# Patient Record
Sex: FEMALE | Race: WHITE | NOT HISPANIC OR LATINO | Employment: OTHER | URBAN - METROPOLITAN AREA
[De-identification: names, ages, dates, MRNs, and addresses within clinical notes are randomized per-mention and may not be internally consistent; named-entity substitution may affect disease eponyms.]

---

## 2017-01-11 DIAGNOSIS — M19.90 ARTHRITIS: ICD-10-CM

## 2017-01-11 RX ORDER — DICLOFENAC SODIUM 75 MG/1
TABLET, DELAYED RELEASE ORAL
Qty: 60 TABLET | Refills: 2 | Status: SHIPPED | OUTPATIENT
Start: 2017-01-11 | End: 2017-03-01 | Stop reason: SDUPTHER

## 2017-01-13 ENCOUNTER — TELEPHONE (OUTPATIENT)
Dept: INTERNAL MEDICINE | Facility: CLINIC | Age: 82
End: 2017-01-13

## 2017-01-17 ENCOUNTER — OFFICE VISIT (OUTPATIENT)
Dept: INTERNAL MEDICINE | Facility: CLINIC | Age: 82
End: 2017-01-17

## 2017-01-17 VITALS
WEIGHT: 134 LBS | BODY MASS INDEX: 25.3 KG/M2 | RESPIRATION RATE: 14 BRPM | DIASTOLIC BLOOD PRESSURE: 90 MMHG | HEIGHT: 61 IN | SYSTOLIC BLOOD PRESSURE: 170 MMHG

## 2017-01-17 DIAGNOSIS — I10 ESSENTIAL HYPERTENSION: Primary | ICD-10-CM

## 2017-01-17 PROCEDURE — 99213 OFFICE O/P EST LOW 20 MIN: CPT | Performed by: INTERNAL MEDICINE

## 2017-01-17 RX ORDER — CHLORTHALIDONE 25 MG/1
25 TABLET ORAL DAILY
Qty: 90 TABLET | Refills: 1 | Status: SHIPPED | OUTPATIENT
Start: 2017-01-17 | End: 2017-03-01 | Stop reason: SDUPTHER

## 2017-01-17 RX ORDER — METOPROLOL TARTRATE 50 MG/1
TABLET, FILM COATED ORAL
Qty: 270 TABLET | Refills: 1 | Status: SHIPPED | OUTPATIENT
Start: 2017-01-17 | End: 2017-01-28 | Stop reason: SDUPTHER

## 2017-01-17 NOTE — PATIENT INSTRUCTIONS
Continue metoprolol 50 mgm, 2 in the mornings and one in the evenings. Continue current doses of lisinopril and amlodipine.  Add chlorthalidone.  Take this in the morning.

## 2017-01-17 NOTE — PROGRESS NOTES
"Chief Complaint   Patient presents with   • Hypertension     B/P has been running high been running 190/93 pulse of 64         Subjective   Karena Moscoso is a 89 y.o. female     Hypertension   This is a chronic problem. The problem is uncontrolled. Associated symptoms include blurred vision (macular degeneration) and headaches (head feels strange when blood pressure is up). Pertinent negatives include no chest pain, orthopnea, palpitations, peripheral edema, PND or shortness of breath. Agents associated with hypertension include NSAIDs. Treatments tried: current treatment: ACE-I, CCB, BB.   :   She had been taking metoprolol 50 mgm, 2 in the am and one in the pm since Friday and had continued usual dose of lisinopril and amlodipine.      Blood pressure has been 184/74, 176/78, 190/93,     The following portions of the patient's history were reviewed and updated as appropriate: allergies, current medications, past social history, problem list, past surgical history    Review of Systems   Constitutional: Negative for activity change and appetite change.   HENT: Negative for nosebleeds.    Eyes: Positive for blurred vision (macular degeneration).   Respiratory: Negative for cough and shortness of breath.    Cardiovascular: Negative for chest pain, palpitations, orthopnea, leg swelling and PND.   Neurological: Positive for headaches (head feels strange when blood pressure is up).   Psychiatric/Behavioral: Negative for sleep disturbance.       Visit Vitals   • /90 (BP Location: Left arm, Patient Position: Sitting, Cuff Size: Adult)   • Resp 14   • Ht 61\" (154.9 cm)   • Wt 134 lb (60.8 kg)   • BMI 25.32 kg/m2    Blood pressure initially 184/92    Objective   Physical Exam   Constitutional: She is oriented to person, place, and time. She appears well-developed and well-nourished. No distress.   Neck: Normal carotid pulses present. Carotid bruit is not present.   Cardiovascular: Normal rate, regular rhythm, S1 normal, " S2 normal and intact distal pulses.  Exam reveals no gallop and no friction rub.    No murmur heard.  Pulses:       Carotid pulses are 2+ on the right side, and 2+ on the left side.  Pulmonary/Chest: Effort normal and breath sounds normal. No respiratory distress. She has no wheezes. She has no rales. Chest wall is not dull to percussion.   Musculoskeletal: She exhibits no edema.   Neurological: She is alert and oriented to person, place, and time.   Skin: Skin is warm and dry.   Nursing note and vitals reviewed.      Assessment/Plan   Problem List Items Addressed This Visit        Cardiovascular and Mediastinum    Essential hypertension - Primary    Relevant Medications    metoprolol tartrate (LOPRESSOR) 50 MG tablet    chlorthalidone (HYGROTON) 25 MG tablet        Chlorthalidone added today.  Recommended she continue the increased dose of metoprolol.  Return on Friday to reevaluate blood pressure and check nonfasting basic metabolic panel.

## 2017-01-17 NOTE — MR AVS SNAPSHOT
Karena GEORGES Danis   1/17/2017 3:00 PM   Office Visit    Dept Phone:  [phone]   Encounter #:  [id number]    Provider:  Alexus Grimm MD   Department:  [facility] INTERNAL MEDICINE                Your Full Care Plan              Today's Medication Changes          These changes are accurate as of: 1/17/17  4:03 PM.  If you have any questions, ask your nurse or doctor.               New Medication(s)Ordered:     chlorthalidone 25 MG tablet   Commonly known as:  HYGROTON   Take 1 tablet by mouth Daily.   Started by:  Alexus Grimm MD         Medication(s)that have changed:     metoprolol tartrate 50 MG tablet   Commonly known as:  LOPRESSOR   Take two tablets each morning and one each evening   What changed:    - how much to take  - how to take this  - when to take this  - additional instructions   Changed by:  Alexus Grimm MD         Stop taking medication(s)listed here:     lisinopril 40 MG tablet   Commonly known as:  PRINIVIL,ZESTRIL   Stopped by:  Alexus Grimm MD                Where to Get Your Medications      These medications were sent to [address] - [phone]  - [phone] [place] [address]     Phone:  [phone]     chlorthalidone 25 MG tablet    metoprolol tartrate 50 MG tablet                  Your Updated Medication List          This list is accurate as of: 1/17/17  4:03 PM.  Always use your most recent med list.                amLODIPine 10 MG tablet   Commonly known as:  NORVASC   TAKE ONE TABLET BY MOUTH DAILY       aspirin 81 MG EC tablet       atorvastatin 20 MG tablet   Commonly known as:  LIPITOR       CALCIUM 600 PO       carBAMazepine 200 MG 12 hr capsule   Commonly known as:  CARBATROL       chlorthalidone 25 MG tablet   Commonly known as:  HYGROTON   Take 1 tablet by mouth Daily.       CLARITIN-D 12 HOUR PO       diclofenac 75 MG EC tablet    Commonly known as:  VOLTAREN   TAKE ONE TABLET BY MOUTH TWICE A DAY WITH FOOD       digoxin 125 MCG tablet   Commonly known as:  LANOXIN   TAKE ONE TABLET BY MOUTH DAILY       doxepin 100 MG capsule   Commonly known as:  SINEquan       GNP VITAMIN C CR PO       guaiFENesin 600 MG 12 hr tablet   Commonly known as:  MUCINEX       HYDROcodone-acetaminophen 5-500 MG per tablet   Commonly known as:  VICODIN       ICAPS PLUS PO       meclizine 25 MG tablet   Commonly known as:  ANTIVERT   Take 1 tablet by mouth 3 (Three) Times a Day As Needed for dizziness.       methotrexate 2.5 MG tablet   Commonly known as:  RHEUMATREX       metoprolol tartrate 50 MG tablet   Commonly known as:  LOPRESSOR   Take two tablets each morning and one each evening       NASONEX 50 MCG/ACT nasal spray   Generic drug:  mometasone               You Were Diagnosed With        Codes Comments    Essential hypertension    -  Primary ICD-10-CM: I10  ICD-9-CM: 401.9       Instructions    Continue metoprolol 50 mgm, 2 in the mornings and one in the evenings. Continue current doses of lisinopril and amlodipine.  Add chlorthalidone.  Take this in the morning.      Patient Instructions History      Upcoming Appointments     Visit Type Date Time Department    OFFICE VISIT 2017  3:00 PM [facility]    FOLLOW UP 2017  8:15 AM [facility] Signup     [facility] [employer] allows you to send messages to your doctor, view your test results, renew your prescriptions, schedule appointments, and more. To sign up, go to [employer] and click on the Sign Up Now link in the New User? box. Enter your [employer] Activation Code exactly as it appears below along with the last four digits of your Social Security Number and your Date of Birth () to complete the sign-up process. If you do not sign up before the expiration date, you must request a new code.    [employer] Activation Code: [email or web address]  Expires: 2017  3:55  "PM    If you have questions, you can email [email or web address] or call [phone] to talk to our MyChart staff. Remember, [employer] is NOT to be used for urgent needs. For medical emergencies, dial 911.               Other Info from Your Visit           Your Appointments     Feb 28, 2017  8:15 AM EST   Follow Up with Alexus Grimm MD   [facility] INTERNAL MEDICINE (--)    [facility] [address] [address] [phone]           Arrive 15 minutes prior to appointment.              Allergies     Codeine      Milk-related Compounds      Lactose intolerant      Reason for Visit     Hypertension B/P has been running high been running 190/93 pulse of 64       Vital Signs     Blood Pressure Respirations Height Weight Body Mass Index Smoking Status    170/90 (BP Location: Left arm, Patient Position: Sitting, Cuff Size: Adult) 14 61\" (154.9 cm) 134 lb (60.8 kg) 25.32 kg/m2 Never Smoker      Problems and Diagnoses Noted     High blood pressure        "

## 2017-01-20 ENCOUNTER — OFFICE VISIT (OUTPATIENT)
Dept: INTERNAL MEDICINE | Facility: CLINIC | Age: 82
End: 2017-01-20

## 2017-01-20 VITALS
HEIGHT: 61 IN | SYSTOLIC BLOOD PRESSURE: 160 MMHG | WEIGHT: 130 LBS | DIASTOLIC BLOOD PRESSURE: 84 MMHG | RESPIRATION RATE: 14 BRPM | BODY MASS INDEX: 24.55 KG/M2

## 2017-01-20 DIAGNOSIS — I10 ESSENTIAL HYPERTENSION: Primary | ICD-10-CM

## 2017-01-20 LAB
BUN SERPL-MCNC: 26 MG/DL (ref 8–23)
BUN/CREAT SERPL: 28.6 (ref 7–25)
CALCIUM SERPL-MCNC: 9.9 MG/DL (ref 8.2–9.6)
CHLORIDE SERPL-SCNC: 97 MMOL/L (ref 98–107)
CO2 SERPL-SCNC: 29.4 MMOL/L (ref 22–29)
CREAT SERPL-MCNC: 0.91 MG/DL (ref 0.57–1)
GLUCOSE SERPL-MCNC: 102 MG/DL (ref 65–99)
POTASSIUM SERPL-SCNC: 4.4 MMOL/L (ref 3.5–5.2)
SODIUM SERPL-SCNC: 142 MMOL/L (ref 136–145)

## 2017-01-20 PROCEDURE — 99212 OFFICE O/P EST SF 10 MIN: CPT | Performed by: INTERNAL MEDICINE

## 2017-01-20 NOTE — MR AVS SNAPSHOT
Karena Moscoso   1/20/2017 9:45 AM   Office Visit    Dept Phone:  [phone]   Encounter #:  [id number]    Provider:  Alexus Grimm MD   Department:  [facility] INTERNAL MEDICINE                Your Full Care Plan              Your Updated Medication List          This list is accurate as of: 1/20/17 10:06 AM.  Always use your most recent med list.                amLODIPine 10 MG tablet   Commonly known as:  NORVASC   TAKE ONE TABLET BY MOUTH DAILY       aspirin 81 MG EC tablet       atorvastatin 20 MG tablet   Commonly known as:  LIPITOR       CALCIUM 600 PO       carBAMazepine 200 MG 12 hr capsule   Commonly known as:  CARBATROL       chlorthalidone 25 MG tablet   Commonly known as:  HYGROTON   Take 1 tablet by mouth Daily.       CLARITIN-D 12 HOUR PO       diclofenac 75 MG EC tablet   Commonly known as:  VOLTAREN   TAKE ONE TABLET BY MOUTH TWICE A DAY WITH FOOD       digoxin 125 MCG tablet   Commonly known as:  LANOXIN   TAKE ONE TABLET BY MOUTH DAILY       doxepin 100 MG capsule   Commonly known as:  SINEquan       GNP VITAMIN C CR PO       guaiFENesin 600 MG 12 hr tablet   Commonly known as:  MUCINEX       HYDROcodone-acetaminophen 5-500 MG per tablet   Commonly known as:  VICODIN       ICAPS PLUS PO       meclizine 25 MG tablet   Commonly known as:  ANTIVERT   Take 1 tablet by mouth 3 (Three) Times a Day As Needed for dizziness.       methotrexate 2.5 MG tablet   Commonly known as:  RHEUMATREX       metoprolol tartrate 50 MG tablet   Commonly known as:  LOPRESSOR   Take two tablets each morning and one each evening       NASONEX 50 MCG/ACT nasal spray   Generic drug:  mometasone               You Were Diagnosed With        Codes Comments    Essential hypertension    -  Primary ICD-10-CM: I10  ICD-9-CM: 401.9       Instructions     None    Patient Instructions History      Upcoming Appointments     Visit Type Date Time Department    FOLLOW UP 1/20/2017  9:45 AM ALEXX WILLIS  [facility]    FOLLOW UP 2017  8:15 AM ALEXX WILLIS [facility]      [employer] Signup     [facility] [employer] allows you to send messages to your doctor, view your test results, renew your prescriptions, schedule appointments, and more. To sign up, go to [employer] and click on the Sign Up Now link in the New User? box. Enter your [employer] Activation Code exactly as it appears below along with the last four digits of your Social Security Number and your Date of Birth () to complete the sign-up process. If you do not sign up before the expiration date, you must request a new code.    [employer] Activation Code: [email or web address]  Expires: 2017  3:55 PM    If you have questions, you can email [email or web address] or call [phone] to talk to our [employer] staff. Remember, [employer] is NOT to be used for urgent needs. For medical emergencies, dial 911.               Other Info from Your Visit           Your Appointments     2017  8:15 AM EST   Follow Up with Alexus Grimm MD   [facility] INTERNAL MEDICINE (--)    [address] [address] [phone]           Arrive 15 minutes prior to appointment.              Allergies     Codeine      Milk-related Compounds      Lactose intolerant      Reason for Visit     Hypertension follow up     Earache right ear pain but hurts down into throat       Vital Signs     Blood Pressure Respirations Height Weight Body Mass Index Smoking Status    160/84 (BP Location: Left arm, Patient Position: Sitting, Cuff Size: Adult) 14 61\" (154.9 cm) 130 lb (59 kg) 24.56 kg/m2 Never Smoker      Problems and Diagnoses Noted     High blood pressure        "

## 2017-01-20 NOTE — PROGRESS NOTES
"Chief Complaint   Patient presents with   • Hypertension     follow up    • Earache     right ear pain but hurts down into throat         Subjective   Karena Moscoso is a 89 y.o. female     HPI: at her last appointment, chlorthalidone was added to her antihypertensive regimen because of higher blood pressure readings.  She has not been taking this for the last 2 days.  She reports no side effects.  Blood pressures at home have been a little bit better.  Systolic has been usually around 160 although she says that last night it did get up to 180.  She denies headaches, dizziness.    Her right ear started to hurt recently.  No fever, chills, sweats.  No sore throat.  No cough.    The following portions of the patient's history were reviewed and updated as appropriate: allergies, current medications, past social history, problem list, past surgical history    Review of Systems   Constitutional: Negative for appetite change.   Cardiovascular: Negative for palpitations.       Visit Vitals   • /84 (BP Location: Left arm, Patient Position: Sitting, Cuff Size: Adult)   • Resp 14   • Ht 61\" (154.9 cm)   • Wt 130 lb (59 kg)   • BMI 24.56 kg/m2   recheck right arm 164/86.     Objective   Physical Exam   Cardiovascular: Normal rate, regular rhythm and normal heart sounds.    Pulmonary/Chest: Breath sounds normal.   Nursing note and vitals reviewed.      Assessment/Plan   Problem List Items Addressed This Visit        Cardiovascular and Mediastinum    Essential hypertension - Primary    Relevant Orders    Basic Metabolic Panel        Blood pressure is improved.  She will continue her current regimen.  Basic metabolic panel pending.  Return as scheduled in February.  On examination of her ears, both tympanic membranes are clear, intact, no erythema.  Posterior pharynx is clear.  She will observe her current symptoms of right earache.  "

## 2017-01-30 RX ORDER — METOPROLOL TARTRATE 50 MG/1
TABLET, FILM COATED ORAL
Qty: 90 TABLET | Refills: 5 | Status: SHIPPED | OUTPATIENT
Start: 2017-01-30 | End: 2017-03-01 | Stop reason: SDUPTHER

## 2017-02-14 RX ORDER — LISINOPRIL 40 MG/1
TABLET ORAL
Qty: 60 TABLET | Refills: 10 | Status: SHIPPED | OUTPATIENT
Start: 2017-02-14 | End: 2017-03-01 | Stop reason: SDUPTHER

## 2017-02-21 ENCOUNTER — TELEPHONE (OUTPATIENT)
Dept: INTERNAL MEDICINE | Facility: CLINIC | Age: 82
End: 2017-02-21

## 2017-02-21 NOTE — TELEPHONE ENCOUNTER
Notified Dr Moscoso (patients son) we would be more than happy to draw labs needed for her. Son informed at the time of the appointment he will be bringing fax numbers and everything needed so that all physicians his mother attends stay on the same page.

## 2017-02-21 NOTE — TELEPHONE ENCOUNTER
----- Message from Regina Sheppard sent at 2/21/2017 10:10 AM EST -----  Pt has appt with you next week for ov and labs. Pts son calling to see if you will draw a  Carbamazepine Level will she is here.  He has oked with Dr López.  Pt sees Dr López in 2 weeks and pts son would like to have labs drawn at the same time to make it easier on her.   He woul dlike to have you check if the lab could draw a  - Carbamazepine Level ( generic for CARBATROL)   Please advise  Pts son- Dr. Jeovanny Moscoso -[phone](cell)  Or [phone] (home)

## 2017-02-28 ENCOUNTER — OFFICE VISIT (OUTPATIENT)
Dept: INTERNAL MEDICINE | Facility: CLINIC | Age: 82
End: 2017-02-28

## 2017-02-28 VITALS
SYSTOLIC BLOOD PRESSURE: 128 MMHG | HEIGHT: 61 IN | WEIGHT: 132 LBS | DIASTOLIC BLOOD PRESSURE: 70 MMHG | BODY MASS INDEX: 24.92 KG/M2

## 2017-02-28 DIAGNOSIS — R00.2 PALPITATIONS: ICD-10-CM

## 2017-02-28 DIAGNOSIS — E78.5 HYPERLIPIDEMIA, UNSPECIFIED HYPERLIPIDEMIA TYPE: ICD-10-CM

## 2017-02-28 DIAGNOSIS — I10 ESSENTIAL HYPERTENSION: Primary | ICD-10-CM

## 2017-02-28 DIAGNOSIS — G50.0 TRIGEMINAL NEURALGIA: ICD-10-CM

## 2017-02-28 DIAGNOSIS — Z79.899 ENCOUNTER FOR MEDICATION MANAGEMENT: ICD-10-CM

## 2017-02-28 LAB
ALBUMIN SERPL-MCNC: 3.54 G/DL (ref 3.4–4.6)
ALBUMIN/GLOB SERPL: 1.2 G/DL
ALP SERPL-CCNC: 102 U/L (ref 46–116)
ALT SERPL W P-5'-P-CCNC: 22 U/L (ref 14–59)
ANION GAP SERPL CALCULATED.3IONS-SCNC: 8 MMOL/L
AST SERPL-CCNC: 18 U/L (ref 7–37)
BILIRUB SERPL-MCNC: 0.2 MG/DL (ref 0.2–1)
BUN BLD-MCNC: 32 MG/DL (ref 6–22)
BUN/CREAT SERPL: 34.4 (ref 7–25)
CALCIUM SPEC-SCNC: 9 MG/DL (ref 8.6–10.5)
CHLORIDE SERPL-SCNC: 100 MMOL/L (ref 95–107)
CHOLEST SERPL-MCNC: 206 MG/DL (ref 0–200)
CO2 SERPL-SCNC: 31 MMOL/L (ref 23–32)
CREAT BLD-MCNC: 0.93 MG/DL (ref 0.55–1.02)
DEPRECATED RDW RBC AUTO: 48.2 FL (ref 37–54)
ERYTHROCYTE [DISTWIDTH] IN BLOOD BY AUTOMATED COUNT: 14.2 % (ref 11.5–15)
GFR SERPL CREATININE-BSD FRML MDRD: 57 ML/MIN/1.73
GLOBULIN UR ELPH-MCNC: 3.1 GM/DL
GLUCOSE BLD-MCNC: 92 MG/DL (ref 70–100)
HCT VFR BLD AUTO: 37.3 % (ref 34.1–44.9)
HDLC SERPL-MCNC: 57 MG/DL (ref 40–81)
HGB BLD-MCNC: 11.9 G/DL (ref 11.2–15.7)
LDLC SERPL CALC-MCNC: 116 MG/DL (ref 0–100)
LDLC/HDLC SERPL: 2.04 {RATIO}
MCH RBC QN AUTO: 30.4 PG (ref 26–34)
MCHC RBC AUTO-ENTMCNC: 31.9 G/DL (ref 31–37)
MCV RBC AUTO: 95.2 FL (ref 80–100)
PLATELET # BLD AUTO: 210 10*3/MM3 (ref 150–450)
PMV BLD AUTO: 11.9 FL (ref 6–12)
POTASSIUM BLD-SCNC: 4.7 MMOL/L (ref 3.3–5.3)
PROT SERPL-MCNC: 6.6 G/DL (ref 6.3–8.4)
RBC # BLD AUTO: 3.92 10*6/MM3 (ref 3.93–5.22)
SODIUM BLD-SCNC: 139 MMOL/L (ref 136–145)
TRIGL SERPL-MCNC: 163 MG/DL (ref 0–150)
VLDLC SERPL-MCNC: 32.6 MG/DL
WBC NRBC COR # BLD: 6.59 10*3/MM3 (ref 5–10)

## 2017-02-28 PROCEDURE — 80061 LIPID PANEL: CPT | Performed by: INTERNAL MEDICINE

## 2017-02-28 PROCEDURE — 99213 OFFICE O/P EST LOW 20 MIN: CPT | Performed by: INTERNAL MEDICINE

## 2017-02-28 PROCEDURE — 85027 COMPLETE CBC AUTOMATED: CPT | Performed by: INTERNAL MEDICINE

## 2017-02-28 PROCEDURE — 80053 COMPREHEN METABOLIC PANEL: CPT | Performed by: INTERNAL MEDICINE

## 2017-02-28 PROCEDURE — 36415 COLL VENOUS BLD VENIPUNCTURE: CPT | Performed by: INTERNAL MEDICINE

## 2017-02-28 NOTE — PROGRESS NOTES
Chief Complaint   Patient presents with   • Hyperlipidemia   • Hypertension        Subjective   Karena Moscoso is a 89 y.o. female     HPI: Hyperlipidemia:  No management changes were made at her last appointment. Her most recent lipid panel was done on 9/27/201.  Total cholesterol was 219, Triglycerides 180, HDL 65, .  She is currently taking a statin.  Prudent diet and regular exercise have also been recommended. By report, there is good compliance with treatment and good tolerance.  She has not experienced statin associated myalgias, dyspepsia.  She has not had liver enzyme elevation.     Hypertension:  A thiazide diuretic was added at her last appointment.     She has not had problems with headaches, visual disturbance, dizziness.  Recent blood pressures have been controlled.  She has not had associated symptoms of chest pain, syncope, edema, orthopnea, GIRARD, PND.   She is currently taking a CCB, thiazide diuretic, ACE-I, BB.  Prudent diet and regular exercise have also been recommended. By report, there is good compliance and good tolerance of medication.     Trigeminal neuralgia: She is currently taking carbamazepine for this.    Palpitations: She has not noticed palpitations recently.  She continues to take a beta blocker for this.  She also takes digoxin.            The following portions of the patient's history were reviewed and updated as appropriate: allergies, current medications, past social history, problem list, past surgical history    Review of Systems   Constitutional: Negative for activity change and appetite change.   HENT: Negative for nosebleeds.    Eyes: Negative for visual disturbance.   Respiratory: Positive for shortness of breath (climbing steps, walking fast). Negative for cough.    Cardiovascular: Negative for chest pain, palpitations and leg swelling.   Neurological: Negative for headaches.   Psychiatric/Behavioral: Negative for sleep disturbance.       Visit Vitals   • /70  "(BP Location: Left arm, Patient Position: Sitting, Cuff Size: Adult)   • Ht 61\" (154.9 cm)   • Wt 132 lb (59.9 kg)   • BMI 24.94 kg/m2        Objective   Physical Exam   Constitutional: She is oriented to person, place, and time. She appears well-developed and well-nourished. No distress.   Using a walker   Neck: Normal carotid pulses present. Carotid bruit is not present.   Cardiovascular: Normal rate, regular rhythm, S1 normal, S2 normal and intact distal pulses.  Exam reveals no gallop and no friction rub.    Murmur heard.  Pulses:       Carotid pulses are 2+ on the right side, and 2+ on the left side.  Pulmonary/Chest: Effort normal and breath sounds normal. No respiratory distress. She has no wheezes. She has no rhonchi. She has no rales. Chest wall is not dull to percussion.   Musculoskeletal: She exhibits no edema.   Neurological: She is alert and oriented to person, place, and time.   Skin: Skin is warm and dry.   Nursing note and vitals reviewed.      Assessment/Plan   Problem List Items Addressed This Visit        Cardiovascular and Mediastinum    Hyperlipidemia    Essential hypertension - Primary    Relevant Orders    Comprehensive Metabolic Panel    CBC (No Diff)    Lipid Panel       Nervous and Auditory    Trigeminal neuralgia      Other Visit Diagnoses     Encounter for medication management        Relevant Orders    Carbamazepine level, total    Digoxin level            "

## 2017-03-01 DIAGNOSIS — M19.90 ARTHRITIS: ICD-10-CM

## 2017-03-01 RX ORDER — MECLIZINE HYDROCHLORIDE 25 MG/1
25 TABLET ORAL 3 TIMES DAILY PRN
Qty: 90 TABLET | Refills: 3 | Status: SHIPPED | OUTPATIENT
Start: 2017-03-01 | End: 2017-12-07

## 2017-03-01 RX ORDER — METOPROLOL TARTRATE 50 MG/1
TABLET, FILM COATED ORAL
Qty: 270 TABLET | Refills: 3 | Status: SHIPPED | OUTPATIENT
Start: 2017-03-01 | End: 2017-12-07 | Stop reason: SDUPTHER

## 2017-03-01 RX ORDER — DICLOFENAC SODIUM 75 MG/1
75 TABLET, DELAYED RELEASE ORAL 2 TIMES DAILY
Qty: 180 TABLET | Refills: 3 | Status: SHIPPED | OUTPATIENT
Start: 2017-03-01

## 2017-03-01 RX ORDER — AMLODIPINE BESYLATE 10 MG/1
10 TABLET ORAL DAILY
Qty: 90 TABLET | Refills: 3 | Status: SHIPPED | OUTPATIENT
Start: 2017-03-01

## 2017-03-01 RX ORDER — LISINOPRIL 40 MG/1
40 TABLET ORAL 2 TIMES DAILY
Qty: 180 TABLET | Refills: 3 | Status: SHIPPED | OUTPATIENT
Start: 2017-03-01

## 2017-03-01 RX ORDER — DOXEPIN HYDROCHLORIDE 100 MG/1
100 CAPSULE ORAL NIGHTLY
Qty: 90 CAPSULE | Refills: 3 | Status: SHIPPED | OUTPATIENT
Start: 2017-03-01 | End: 2017-06-23

## 2017-03-01 RX ORDER — CHLORTHALIDONE 25 MG/1
25 TABLET ORAL DAILY
Qty: 90 TABLET | Refills: 3 | Status: SHIPPED | OUTPATIENT
Start: 2017-03-01

## 2017-03-01 RX ORDER — ATORVASTATIN CALCIUM 20 MG/1
20 TABLET, FILM COATED ORAL DAILY
Qty: 90 TABLET | Refills: 3 | Status: ON HOLD | OUTPATIENT
Start: 2017-03-01 | End: 2017-12-15

## 2017-03-01 RX ORDER — MOMETASONE FUROATE 50 UG/1
2 SPRAY, METERED NASAL DAILY
Qty: 3 EACH | Refills: 3 | Status: SHIPPED | OUTPATIENT
Start: 2017-03-01 | End: 2017-12-07

## 2017-03-01 RX ORDER — DIGOXIN 125 MCG
125 TABLET ORAL
Qty: 90 TABLET | Refills: 3 | Status: SHIPPED | OUTPATIENT
Start: 2017-03-01

## 2017-03-08 ENCOUNTER — LAB (OUTPATIENT)
Dept: INTERNAL MEDICINE | Facility: CLINIC | Age: 82
End: 2017-03-08

## 2017-03-08 DIAGNOSIS — Z79.899 ENCOUNTER FOR MEDICATION MANAGEMENT: ICD-10-CM

## 2017-03-08 LAB
CARBAMAZEPINE SERPL-MCNC: 6.7 MCG/ML (ref 8–12)
DIGOXIN SERPL-MCNC: 0.7 NG/ML (ref 0.5–0.9)

## 2017-06-02 NOTE — TELEPHONE ENCOUNTER
----- Message from Rosa M Gordon sent at 1/13/2017  9:33 AM EST -----  Contact: pt  Pt says b/p is elevated. She says 184/74 was the last reading. Pt is concerned and would like to discuss. Please return call.     #[phone]    Magi  
I returned call patient to discuss bp readings. She reports readings as followed: 190/92, 191/94, 184/74, 175/80. She denies any fever, shortness of breath, swelling in legs or chest pain. No dizziness. I will have her increase metoprolol in morning (2 tablets) and 1 tablet pm. She will call on Monday if no change bp and schedule appt.   
20

## 2017-06-23 ENCOUNTER — OFFICE VISIT (OUTPATIENT)
Dept: INTERNAL MEDICINE | Facility: CLINIC | Age: 82
End: 2017-06-23

## 2017-06-23 VITALS
TEMPERATURE: 98.6 F | OXYGEN SATURATION: 96 % | HEIGHT: 61 IN | SYSTOLIC BLOOD PRESSURE: 130 MMHG | WEIGHT: 132 LBS | DIASTOLIC BLOOD PRESSURE: 82 MMHG | HEART RATE: 73 BPM | BODY MASS INDEX: 24.92 KG/M2

## 2017-06-23 DIAGNOSIS — R10.30 LOWER ABDOMINAL PAIN: ICD-10-CM

## 2017-06-23 DIAGNOSIS — K52.9 COLITIS: Primary | ICD-10-CM

## 2017-06-23 LAB
ALBUMIN SERPL-MCNC: 3.63 G/DL (ref 3.4–4.6)
ALBUMIN/GLOB SERPL: 1.1 G/DL
ALP SERPL-CCNC: 109 U/L (ref 46–116)
ALT SERPL W P-5'-P-CCNC: 28 U/L (ref 14–59)
ANION GAP SERPL CALCULATED.3IONS-SCNC: 9 MMOL/L
AST SERPL-CCNC: 18 U/L (ref 7–37)
BASOPHILS # BLD AUTO: 0.04 10*3/MM3 (ref 0–0.2)
BASOPHILS NFR BLD AUTO: 0.6 % (ref 0–2)
BILIRUB SERPL-MCNC: 0.3 MG/DL (ref 0.2–1)
BILIRUB UR QL STRIP: NEGATIVE
BUN BLD-MCNC: 34 MG/DL (ref 6–22)
BUN/CREAT SERPL: 38.6 (ref 7–25)
CALCIUM SPEC-SCNC: 8.8 MG/DL (ref 8.6–10.5)
CHLORIDE SERPL-SCNC: 101 MMOL/L (ref 95–107)
CLARITY UR: CLEAR
CO2 SERPL-SCNC: 29 MMOL/L (ref 23–32)
COLOR UR: YELLOW
CREAT BLD-MCNC: 0.88 MG/DL (ref 0.55–1.02)
DEPRECATED RDW RBC AUTO: 45.6 FL (ref 37–54)
EOSINOPHIL # BLD AUTO: 0.1 10*3/MM3 (ref 0–0.7)
EOSINOPHIL NFR BLD AUTO: 1.5 % (ref 0–5)
ERYTHROCYTE [DISTWIDTH] IN BLOOD BY AUTOMATED COUNT: 13.6 % (ref 11.5–15)
GFR SERPL CREATININE-BSD FRML MDRD: 60 ML/MIN/1.73
GLOBULIN UR ELPH-MCNC: 3.4 GM/DL
GLUCOSE BLD-MCNC: 109 MG/DL (ref 70–100)
GLUCOSE UR STRIP-MCNC: NEGATIVE MG/DL
HCT VFR BLD AUTO: 37.3 % (ref 34.1–44.9)
HGB BLD-MCNC: 11.8 G/DL (ref 11.2–15.7)
HGB UR QL STRIP.AUTO: NEGATIVE
KETONES UR QL STRIP: NEGATIVE
LEUKOCYTE ESTERASE UR QL STRIP.AUTO: NEGATIVE
LYMPHOCYTES # BLD AUTO: 0.98 10*3/MM3 (ref 0.8–7)
LYMPHOCYTES NFR BLD AUTO: 15.1 % (ref 10–60)
MCH RBC QN AUTO: 29.7 PG (ref 26–34)
MCHC RBC AUTO-ENTMCNC: 31.6 G/DL (ref 31–37)
MCV RBC AUTO: 94 FL (ref 80–100)
MONOCYTES # BLD AUTO: 0.59 10*3/MM3 (ref 0–1)
MONOCYTES NFR BLD AUTO: 9.1 % (ref 0–13)
NEUTROPHILS # BLD AUTO: 4.79 10*3/MM3 (ref 1–11)
NEUTROPHILS NFR BLD AUTO: 73.7 % (ref 30–85)
NITRITE UR QL STRIP: NEGATIVE
PH UR STRIP.AUTO: 7 [PH] (ref 5–8)
PLATELET # BLD AUTO: 201 10*3/MM3 (ref 150–450)
PMV BLD AUTO: 11.4 FL (ref 6–12)
POTASSIUM BLD-SCNC: 4.6 MMOL/L (ref 3.3–5.3)
PROT SERPL-MCNC: 7 G/DL (ref 6.3–8.4)
PROT UR QL STRIP: NEGATIVE
RBC # BLD AUTO: 3.97 10*6/MM3 (ref 3.93–5.22)
SODIUM BLD-SCNC: 139 MMOL/L (ref 136–145)
SP GR UR STRIP: 1.01 (ref 1–1.03)
UROBILINOGEN UR QL STRIP: NORMAL
WBC NRBC COR # BLD: 6.5 10*3/MM3 (ref 5–10)

## 2017-06-23 PROCEDURE — 36415 COLL VENOUS BLD VENIPUNCTURE: CPT | Performed by: NURSE PRACTITIONER

## 2017-06-23 PROCEDURE — 81003 URINALYSIS AUTO W/O SCOPE: CPT | Performed by: NURSE PRACTITIONER

## 2017-06-23 PROCEDURE — 80053 COMPREHEN METABOLIC PANEL: CPT | Performed by: NURSE PRACTITIONER

## 2017-06-23 PROCEDURE — 85025 COMPLETE CBC W/AUTO DIFF WBC: CPT | Performed by: NURSE PRACTITIONER

## 2017-06-23 PROCEDURE — 99214 OFFICE O/P EST MOD 30 MIN: CPT | Performed by: NURSE PRACTITIONER

## 2017-06-23 RX ORDER — METRONIDAZOLE 500 MG/1
500 TABLET ORAL 3 TIMES DAILY
Qty: 21 TABLET | Refills: 0 | Status: SHIPPED | OUTPATIENT
Start: 2017-06-23 | End: 2017-06-30

## 2017-06-23 RX ORDER — LOPERAMIDE HYDROCHLORIDE 2 MG/1
2 CAPSULE ORAL 4 TIMES DAILY PRN
COMMUNITY

## 2017-06-23 RX ORDER — CIPROFLOXACIN 250 MG/1
500 TABLET, FILM COATED ORAL 2 TIMES DAILY
Qty: 14 TABLET | Refills: 0 | Status: SHIPPED | OUTPATIENT
Start: 2017-06-23 | End: 2017-06-30

## 2017-06-26 ENCOUNTER — TELEPHONE (OUTPATIENT)
Dept: INTERNAL MEDICINE | Facility: CLINIC | Age: 82
End: 2017-06-26

## 2017-06-26 NOTE — PROGRESS NOTES
Subjective   Karena Moscoso is a 89 y.o. female who presents due to loose, watery stools along with lower abdominal pain and cramping.    HPI Comments: She c/o loose, watery stools for the past several weeks without rectal bleeding. However, over the past few days she c/o lower abdominal pain and cramping which is worse with movement and/or sitting upright. She switched to a liquid diet yesterday which was somewhat helpful. She was hospitalized 2/27/15 for colitis and CT abd/pelvis at that time showed diverticula.    Abdominal Pain   This is a new problem. The current episode started in the past 7 days. The onset quality is gradual. The problem occurs constantly. The problem has been unchanged. The pain is located in the LLQ and RLQ. The pain is at a severity of 5/10. The pain is moderate. The quality of the pain is cramping. The abdominal pain does not radiate. Associated symptoms include diarrhea. Pertinent negatives include no constipation, dysuria, fever, frequency, headaches, melena, myalgias, nausea or vomiting. The pain is aggravated by eating and certain positions. She has tried nothing for the symptoms.   Diarrhea    Associated symptoms include abdominal pain. Pertinent negatives include no chills, coughing, fever, headaches, myalgias or vomiting.        The following portions of the patient's history were reviewed and updated as appropriate: allergies, current medications, past social history and problem list.    Past Medical History:   Diagnosis Date   • Hyperlipidemia    • Hypertension    • Palpitations    • Trigeminal neuralgia          Current Outpatient Prescriptions:   •  amLODIPine (NORVASC) 10 MG tablet, Take 1 tablet by mouth Daily., Disp: 90 tablet, Rfl: 3  •  Ascorbic Acid (GNP VITAMIN C CR PO), Take  by mouth., Disp: , Rfl:   •  aspirin 81 MG EC tablet, Take 81 mg by mouth daily., Disp: , Rfl:   •  atorvastatin (LIPITOR) 20 MG tablet, Take 1 tablet by mouth Daily., Disp: 90 tablet, Rfl: 3  •   Calcium Carbonate (CALCIUM 600 PO), Take  by mouth., Disp: , Rfl:   •  carBAMazepine (CARBATROL) 200 MG 12 hr capsule, Take 200 mg by mouth. Two capsules two times daily, Disp: , Rfl:   •  chlorthalidone (HYGROTON) 25 MG tablet, Take 1 tablet by mouth Daily., Disp: 90 tablet, Rfl: 3  •  diclofenac (VOLTAREN) 75 MG EC tablet, Take 1 tablet by mouth 2 (Two) Times a Day. Take with food, Disp: 180 tablet, Rfl: 3  •  digoxin (LANOXIN) 125 MCG tablet, Take 1 tablet by mouth Daily., Disp: 90 tablet, Rfl: 3  •  lisinopril (PRINIVIL,ZESTRIL) 40 MG tablet, Take 1 tablet by mouth 2 (Two) Times a Day., Disp: 180 tablet, Rfl: 3  •  loperamide (IMODIUM) 2 MG capsule, Take 2 mg by mouth 4 (Four) Times a Day As Needed for Diarrhea., Disp: , Rfl:   •  Loratadine-Pseudoephedrine (CLARITIN-D 12 HOUR PO), Take  by mouth., Disp: , Rfl:   •  meclizine (ANTIVERT) 25 MG tablet, Take 1 tablet by mouth 3 (Three) Times a Day As Needed for dizziness., Disp: 90 tablet, Rfl: 3  •  metoprolol tartrate (LOPRESSOR) 50 MG tablet, Take 2 tablets each morning, 1 tablet each evening., Disp: 270 tablet, Rfl: 3  •  mometasone (NASONEX) 50 MCG/ACT nasal spray, 2 sprays into each nostril Daily., Disp: 3 each, Rfl: 3  •  Multiple Vitamins-Minerals (ICAPS PLUS PO), Take  by mouth., Disp: , Rfl:   •  ciprofloxacin (CIPRO) 250 MG tablet, Take 2 tablets by mouth 2 (Two) Times a Day for 7 days., Disp: 14 tablet, Rfl: 0  •  metroNIDAZOLE (FLAGYL) 500 MG tablet, Take 1 tablet by mouth 3 (Three) Times a Day for 7 days., Disp: 21 tablet, Rfl: 0    Allergies   Allergen Reactions   • Codeine        Review of Systems   Constitutional: Negative for chills, fatigue, fever and unexpected weight change.   HENT: Negative for ear pain, nosebleeds, postnasal drip, sneezing and sore throat.    Eyes: Negative for pain and visual disturbance.   Respiratory: Negative for cough, chest tightness, shortness of breath and wheezing.    Cardiovascular: Negative for chest pain and leg  "swelling.   Gastrointestinal: Positive for abdominal pain and diarrhea. Negative for abdominal distention, anal bleeding, constipation, melena, nausea, rectal pain and vomiting.   Endocrine: Negative for cold intolerance, heat intolerance, polydipsia and polyuria.   Genitourinary: Negative for difficulty urinating, dysuria, frequency and urgency.   Musculoskeletal: Positive for back pain. Negative for myalgias.   Skin: Negative for color change.   Allergic/Immunologic: Negative for immunocompromised state.   Neurological: Negative for dizziness, weakness and headaches.   Psychiatric/Behavioral: Negative for confusion. The patient is not nervous/anxious.        Objective   Vitals:    06/23/17 1416   BP: 130/82   BP Location: Left arm   Patient Position: Sitting   Cuff Size: Adult   Pulse: 73   Temp: 98.6 °F (37 °C)   TempSrc: Oral   SpO2: 96%   Weight: 132 lb (59.9 kg)   Height: 61\" (154.9 cm)     Physical Exam   Constitutional: She is oriented to person, place, and time. She appears well-developed and well-nourished. No distress.   HENT:   Head: Normocephalic.   Right Ear: External ear normal.   Left Ear: External ear normal.   Eyes: EOM are normal. Pupils are equal, round, and reactive to light. No scleral icterus.   Neck: Normal range of motion. Neck supple. No JVD present.   Cardiovascular: Normal rate and regular rhythm.    No murmur heard.  Pulmonary/Chest: Effort normal and breath sounds normal. No respiratory distress. She exhibits no tenderness.   Abdominal: Soft. Bowel sounds are normal. She exhibits no distension and no mass. There is tenderness in the right lower quadrant and left lower quadrant. There is no rebound and no guarding. No hernia.   Lymphadenopathy:     She has no cervical adenopathy.   Neurological: She is alert and oriented to person, place, and time.   Skin: Skin is warm and dry. No rash noted. No erythema.   Psychiatric: She has a normal mood and affect. Her behavior is normal.   Nursing " note and vitals reviewed.      Assessment/Plan   Karena was seen today for abdominal pain and diarrhea.    Diagnoses and all orders for this visit:    Colitis  Comments:  discussed switching to clear liquid diet, advised to report to ER with worsening sx    Lower abdominal pain  Comments:  CBC & UA did not show any acute abnl, will treat for presumed colitis/diverticulitis and re-evaluate sx on Mon. If sx have not improved, consider CT abd/pelvis  Orders:  -     Urinalysis With / Microscopic If Indicated  -     CBC & Differential  -     Comprehensive Metabolic Panel; Future  -     Comprehensive Metabolic Panel  -     CBC Auto Differential  -     ciprofloxacin (CIPRO) 250 MG tablet; Take 2 tablets by mouth 2 (Two) Times a Day for 7 days.  -     metroNIDAZOLE (FLAGYL) 500 MG tablet; Take 1 tablet by mouth 3 (Three) Times a Day for 7 days.

## 2017-07-05 ENCOUNTER — OFFICE VISIT (OUTPATIENT)
Dept: INTERNAL MEDICINE | Facility: CLINIC | Age: 82
End: 2017-07-05

## 2017-07-05 VITALS
HEIGHT: 61 IN | DIASTOLIC BLOOD PRESSURE: 74 MMHG | BODY MASS INDEX: 24.55 KG/M2 | SYSTOLIC BLOOD PRESSURE: 136 MMHG | WEIGHT: 130 LBS

## 2017-07-05 DIAGNOSIS — I10 ESSENTIAL HYPERTENSION: Primary | ICD-10-CM

## 2017-07-05 DIAGNOSIS — E78.5 HYPERLIPIDEMIA, UNSPECIFIED HYPERLIPIDEMIA TYPE: ICD-10-CM

## 2017-07-05 LAB
CHOLEST SERPL-MCNC: 209 MG/DL (ref 0–200)
HDLC SERPL-MCNC: 53 MG/DL (ref 40–60)
LDLC SERPL CALC-MCNC: 127 MG/DL (ref 0–100)
TRIGL SERPL-MCNC: 145 MG/DL (ref 0–150)
VLDLC SERPL-MCNC: 29 MG/DL (ref 5–40)

## 2017-07-05 PROCEDURE — 99213 OFFICE O/P EST LOW 20 MIN: CPT | Performed by: INTERNAL MEDICINE

## 2017-07-05 NOTE — PROGRESS NOTES
Chief Complaint   Patient presents with   • Hyperlipidemia     4 month follow up    • Hypertension   • Diverticulitis     follow up         Subjective   Karena Moscoso is a 89 y.o. female     HPI:  Hypertension: This is a chronic problem.   No management changes were made at her last appointment.       She has not had problems with headaches, visual disturbance, dizziness.  Recent blood pressures have been controlled.    She has not had associated symptoms of chest pain, syncope, edema, orthopnea, PND. She has some shortness of breath with exertion.    Current treatment: Calcium channel blocker, diuretic, ACE inhibitor, beta blocker.  Prudent diet and regular exercise have also been recommended. By report, there is good compliance and good tolerance of medication.  She does not have history of coronary artery disease or stroke.    Hyperlipidemia:  This is a chronic problem.   No management changes were made at her last appointment.   Her most recent lipid panel was done on 2/28/2017.  Total cholesterol was 206, Triglycerides 163, HDL 57, .   Current treatment: Statin medication.   Prudent diet and regular exercise have also been recommended. By report, there is good compliance with treatment and good tolerance.    She has not experienced statin associated myalgias, dyspepsia.  She has not had liver enzyme elevation.     Diverticulitis: She was recently evaluated here with abdominal pain.  She was treated empirically for diverticulitis with Cipro and Flagyl.  She states her symptoms have now resolved.  No fevers, chills, sweats at this time.  Appetite is good.  The abdominal discomfort has resolved.                The following portions of the patient's history were reviewed and updated as appropriate: allergies, current medications, past social history, problem list, past surgical history    Review of Systems   Constitutional: Negative for activity change, appetite change, chills and fever.   HENT: Negative for  "nosebleeds.    Eyes: Negative for visual disturbance.   Respiratory: Positive for shortness of breath (with exercise). Negative for cough.    Cardiovascular: Negative for chest pain, palpitations and leg swelling.   Gastrointestinal: Negative for abdominal pain, constipation, diarrhea, nausea and vomiting.   Neurological: Negative for headaches.   Psychiatric/Behavioral: Positive for sleep disturbance (does not sleep well).           Objective     /74  Ht 61\" (154.9 cm)  Wt 130 lb (59 kg)  BMI 24.56 kg/m2     Physical Exam   Constitutional: She is oriented to person, place, and time. She appears well-developed and well-nourished. No distress.   Neck: Normal carotid pulses present. Carotid bruit is not present.   Cardiovascular: Normal rate, regular rhythm, S1 normal, S2 normal and intact distal pulses.  Exam reveals no gallop and no friction rub.    No murmur heard.  Pulses:       Carotid pulses are 2+ on the right side, and 2+ on the left side.  Pulmonary/Chest: Effort normal and breath sounds normal. No respiratory distress. She has no wheezes. She has no rhonchi. She has no rales. Chest wall is not dull to percussion.   Abdominal: Soft. Bowel sounds are normal. She exhibits no distension. There is no tenderness. There is no rebound and no guarding.   Musculoskeletal: She exhibits no edema.   Neurological: She is alert and oriented to person, place, and time.   Skin: Skin is warm and dry.   Nursing note and vitals reviewed.      Assessment/Plan   Problem List Items Addressed This Visit        Cardiovascular and Mediastinum    Hyperlipidemia    Relevant Orders    Lipid Panel (Completed)    Essential hypertension - Primary    Relevant Orders    Lipid Panel (Completed)      She has recovered well from recent episode of diverticulitis.      "

## 2017-11-10 ENCOUNTER — OFFICE VISIT (OUTPATIENT)
Dept: INTERNAL MEDICINE | Facility: CLINIC | Age: 82
End: 2017-11-10

## 2017-11-10 VITALS
WEIGHT: 130 LBS | DIASTOLIC BLOOD PRESSURE: 78 MMHG | BODY MASS INDEX: 24.55 KG/M2 | SYSTOLIC BLOOD PRESSURE: 152 MMHG | HEIGHT: 61 IN

## 2017-11-10 DIAGNOSIS — I10 ESSENTIAL HYPERTENSION: ICD-10-CM

## 2017-11-10 DIAGNOSIS — Z79.899 ENCOUNTER FOR MEDICATION MANAGEMENT: ICD-10-CM

## 2017-11-10 DIAGNOSIS — L93.2 CUTANEOUS LUPUS ERYTHEMATOSUS: ICD-10-CM

## 2017-11-10 DIAGNOSIS — Z23 NEED FOR VACCINATION: ICD-10-CM

## 2017-11-10 DIAGNOSIS — Z00.00 MEDICARE ANNUAL WELLNESS VISIT, SUBSEQUENT: Primary | ICD-10-CM

## 2017-11-10 DIAGNOSIS — E78.5 HYPERLIPIDEMIA, UNSPECIFIED HYPERLIPIDEMIA TYPE: ICD-10-CM

## 2017-11-10 DIAGNOSIS — Z23 NEED FOR INFLUENZA VACCINATION: ICD-10-CM

## 2017-11-10 LAB
ALBUMIN SERPL-MCNC: 4.2 G/DL (ref 3.5–5.2)
ALBUMIN/GLOB SERPL: 1.4 G/DL
ALP SERPL-CCNC: 88 U/L (ref 39–117)
ALT SERPL W P-5'-P-CCNC: 19 U/L (ref 1–33)
ANION GAP SERPL CALCULATED.3IONS-SCNC: 12.6 MMOL/L
AST SERPL-CCNC: 15 U/L (ref 1–32)
BILIRUB SERPL-MCNC: 0.2 MG/DL (ref 0.1–1.2)
BUN BLD-MCNC: 40 MG/DL (ref 8–23)
BUN/CREAT SERPL: 36.7 (ref 7–25)
CALCIUM SPEC-SCNC: 9.5 MG/DL (ref 8.2–9.6)
CARBAMAZEPINE SERPL-MCNC: 6.5 MCG/ML (ref 4–12)
CHLORIDE SERPL-SCNC: 101 MMOL/L (ref 98–107)
CHOLEST SERPL-MCNC: 223 MG/DL (ref 0–200)
CO2 SERPL-SCNC: 27.4 MMOL/L (ref 22–29)
CREAT BLD-MCNC: 1.09 MG/DL (ref 0.57–1)
GFR SERPL CREATININE-BSD FRML MDRD: 47 ML/MIN/1.73
GLOBULIN UR ELPH-MCNC: 3 GM/DL
GLUCOSE BLD-MCNC: 96 MG/DL (ref 65–99)
HDLC SERPL-MCNC: 53 MG/DL (ref 40–60)
LDLC SERPL CALC-MCNC: 130 MG/DL (ref 0–100)
LDLC/HDLC SERPL: 2.45 {RATIO}
POTASSIUM BLD-SCNC: 4.6 MMOL/L (ref 3.5–5.2)
PROT SERPL-MCNC: 7.2 G/DL (ref 6–8.5)
SODIUM BLD-SCNC: 141 MMOL/L (ref 136–145)
TRIGL SERPL-MCNC: 201 MG/DL (ref 0–150)
VLDLC SERPL-MCNC: 40.2 MG/DL (ref 5–40)

## 2017-11-10 PROCEDURE — 90670 PCV13 VACCINE IM: CPT | Performed by: INTERNAL MEDICINE

## 2017-11-10 PROCEDURE — 80053 COMPREHEN METABOLIC PANEL: CPT | Performed by: INTERNAL MEDICINE

## 2017-11-10 PROCEDURE — 80156 ASSAY CARBAMAZEPINE TOTAL: CPT | Performed by: INTERNAL MEDICINE

## 2017-11-10 PROCEDURE — G0009 ADMIN PNEUMOCOCCAL VACCINE: HCPCS | Performed by: INTERNAL MEDICINE

## 2017-11-10 PROCEDURE — 99214 OFFICE O/P EST MOD 30 MIN: CPT | Performed by: INTERNAL MEDICINE

## 2017-11-10 PROCEDURE — 36415 COLL VENOUS BLD VENIPUNCTURE: CPT | Performed by: INTERNAL MEDICINE

## 2017-11-10 PROCEDURE — 80061 LIPID PANEL: CPT | Performed by: INTERNAL MEDICINE

## 2017-11-10 PROCEDURE — G0439 PPPS, SUBSEQ VISIT: HCPCS | Performed by: INTERNAL MEDICINE

## 2017-11-10 PROCEDURE — G0008 ADMIN INFLUENZA VIRUS VAC: HCPCS | Performed by: INTERNAL MEDICINE

## 2017-11-10 NOTE — PROGRESS NOTES
Chief Complaint   Patient presents with   • Annual Exam     subsequent [facility] exam   • Hyperlipidemia   • Hypertension        Subjective   Karena Moscoso is a 89 y.o. female     HPI: Hypertension: This is a chronic problem.   No management changes were made at her last appointment.       She has not had problems with headaches, visual disturbance, dizziness.  Recent blood pressures have been controlled.    She has not had associated symptoms of chest pain, syncope, edema, orthopnea, GIRARD, PND.     Current treatment: Amlodipine, chlorthalidone, lisinopril, metoprolol.  Prudent diet and regular exercise have also been recommended. By report, there is good compliance and good tolerance of medication.     Hyperlipidemia:  This is a chronic problem.   No management changes were made at her last appointment.   Her most recent lipid panel was done on 7/5/2017.  Total cholesterol was 209, Triglycerides 145, HDL 53, .   Current treatment: Atorvastatin.   Prudent diet and regular exercise have also been recommended. By report, there is good compliance with treatment and good tolerance.    She has not experienced statin associated myalgias, dyspepsia.  She has not had liver enzyme elevation.     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  She has been taking methotrexate.  She was able to taper and stop that.  She was off it for several months and then her symptoms flared up again.  She has resumed methotrexate at the lowest dose.  She takes 2 tablets once a week.  She has been doing this since September.  Her symptoms of cutaneous lupus are slowly resolving.  The illness is manifested by rash.  She needs a refill on methotrexate and would like to get it here rather than through her dermatologist.    Trigeminal neuralgia: She has follow-up scheduled with her neurologist on 11 December.  Needs a carbamazepine level drawn.              The following portions of the patient's history were reviewed and  "updated as appropriate: allergies, current medications, past social history, problem list, past surgical history    Review of Systems   Constitutional: Negative for activity change and appetite change.   HENT: Negative for nosebleeds.    Eyes: Negative for visual disturbance.   Respiratory: Negative for cough and shortness of breath.    Cardiovascular: Negative for chest pain, palpitations and leg swelling.   Neurological: Negative for headaches.   Psychiatric/Behavioral: Negative for sleep disturbance.           Objective     /78  Ht 61\" (154.9 cm)  Wt 130 lb (59 kg)  BMI 24.56 kg/m2     Physical Exam   Constitutional: She is oriented to person, place, and time. She appears well-developed and well-nourished. No distress.   Neck: Normal carotid pulses present. Carotid bruit is not present.   Cardiovascular: Normal rate, regular rhythm, S1 normal, S2 normal and intact distal pulses.  Exam reveals no gallop and no friction rub.    No murmur heard.  Pulses:       Carotid pulses are 2+ on the right side, and 2+ on the left side.  Pulmonary/Chest: Effort normal and breath sounds normal. No respiratory distress. She has no wheezes. She has no rhonchi. She has no rales. Chest wall is not dull to percussion.   Musculoskeletal: She exhibits no edema.   Neurological: She is alert and oriented to person, place, and time.   Skin: Skin is warm and dry. Rash (small patches of rash on her arms) noted.   Nursing note and vitals reviewed.      Assessment/Plan   Problem List Items Addressed This Visit        Cardiovascular and Mediastinum    Hyperlipidemia    Essential hypertension       Musculoskeletal and Integument    Cutaneous lupus erythematosus      Other Visit Diagnoses     Medicare annual wellness visit, subsequent    -  Primary    Relevant Orders    Comprehensive Metabolic Panel    Lipid Panel    Need for influenza vaccination        Relevant Orders    Flu Vaccine High Dose PF 65YR+ ([id number]) (Completed)    Need " for vaccination        Relevant Orders    Pneumococcal Conjugate Vaccine 13-Valent All (Completed)    Encounter for medication management        Relevant Orders    Carbamazepine Level, Total        Methotrexate refilled.  They will monitor her symptoms.

## 2017-11-10 NOTE — PATIENT INSTRUCTIONS
Medicare Wellness  Personal Prevention Plan of Service     Date of Office Visit:  11/10/2017  Encounter Provider:  Alexus Grimm MD  Place of Service:  [facility]  Patient Name: Karena Moscoso  :  1926    As part of the Medicare Wellness portion of your visit today, we are providing you with this personalized preventive plan of services (PPPS). This plan is based upon recommendations of the [place] Preventive Services Task Force (USPSTF) and the Advisory Committee on Immunization Practices (ACIP).    This lists the preventive care services that should be considered, and provides dates of when you are due. Items listed as completed are up-to-date and do not require any further intervention.    Health Maintenance   Topic Date Due   • TDAP/TD VACCINES (1 - Tdap) 1945   • PNEUMOCOCCAL VACCINES (65+ LOW/MEDIUM RISK) (1 of 2 - PCV13) 1991   • MEDICARE ANNUAL WELLNESS  2016   • ZOSTER VACCINE  2016   • MAMMOGRAM  2016   • INFLUENZA VACCINE  2017   • LIPID PANEL  2018       Orders Placed This Encounter   Procedures   • Flu Vaccine High Dose PF 65YR+ ([id number])   • Pneumococcal Conjugate Vaccine 13-Valent All   • Comprehensive Metabolic Panel     Standing Status:   Future     Number of Occurrences:   1   • Lipid Panel     Standing Status:   Future     Number of Occurrences:   1     Standing Expiration Date:   11/10/2018   • Carbamazepine Level, Total     Standing Status:   Future     Number of Occurrences:   1     Standing Expiration Date:   11/10/2018     Discussed recommended testing.  Flu shot and Prevnar 13 administered today.  At this time, we are going to hold off on doing mammograms.  Return in about 4 months (around 3/10/2018) for Recheck, 30 (THIRTY) MIN, Fasting.

## 2017-11-10 NOTE — PROGRESS NOTES
QUICK REFERENCE INFORMATION:  The ABCs of the Annual Wellness Visit    Subsequent Medicare Wellness Visit    HEALTH RISK ASSESSMENT    5/16/1926    Recent Hospitalizations:  No hospitalization(s) within the last year..        Current Medical Providers:  Patient Care Team:  Alexus Grimm MD as PCP - General (Internal Medicine)  SERENA Langford as PCP - Claims Attributed        Smoking Status:  History   Smoking Status   • Never Smoker   Smokeless Tobacco   • Not on file       Alcohol Consumption:  History   Alcohol Use No       Depression Screen:   PHQ-2/PHQ-9 Depression Screening 11/10/2017   Little interest or pleasure in doing things 0   Feeling down, depressed, or hopeless 1   Trouble falling or staying asleep, or sleeping too much 0   Feeling tired or having little energy 1   Poor appetite or overeating 1   Feeling bad about yourself - or that you are a failure or have let yourself or your family down 1   Trouble concentrating on things, such as reading the newspaper or watching television 0   Moving or speaking so slowly that other people could have noticed. Or the opposite - being so fidgety or restless that you have been moving around a lot more than usual 0   Thoughts that you would be better off dead, or of hurting yourself in some way 0   Total Score 4   If you checked off any problems, how difficult have these problems made it for you to do your work, take care of things at home, or get along with other people? Not difficult at all       Health Habits and Functional and Cognitive Screening:  Functional & Cognitive Status 11/10/2017   Do you have difficulty preparing food and eating? No   Do you have difficulty bathing yourself, getting dressed or grooming yourself? No   Do you have difficulty using the toilet? No   Do you have difficulty moving around from place to place? Yes   Do you have trouble with steps or getting out of a bed or a chair? Yes   In the past year have you fallen or experienced a  near fall? Yes   Current Diet Well Balanced Diet   Dental Exam Up to date   Eye Exam Up to date   Exercise (times per week) 3 times per week   Do you need help using the phone?  No   Are you deaf or do you have serious difficulty hearing?  No   Do you need help with transportation? Yes   Do you need help shopping? No   Do you need help preparing meals?  No   Do you need help with housework?  Yes   Do you need help with laundry? No   Do you need help taking your medications? No   Do you need help managing money? No   Have you felt unusual stress, anger or loneliness in the last month? Yes   Who do you live with? Alone   If you need help, do you have trouble finding someone available to you? No   Have you been bothered in the last four weeks by sexual problems? No   Do you have difficulty concentrating, remembering or making decisions? Yes           Does the patient have evidence of cognitive impairment? No    Aspirin use counseling: Taking ASA appropriately as indicated      Recent Lab Results:  CMP:  Lab Results   Component Value Date     (H) 01/20/2017    BUN 34 (H) 06/23/2017    CREATININE 0.88 06/23/2017    EGFRIFNONA 60 (L) 06/23/2017    EGFRIFAFRI 70 01/20/2017    BCR 38.6 (H) 06/23/2017     06/23/2017    K 4.6 06/23/2017    CO2 29.0 06/23/2017    CALCIUM 8.8 06/23/2017    PROTENTOTREF 6.6 05/23/2016    ALBUMIN 3.63 06/23/2017    LABGLOBREF 2.4 05/23/2016    LABIL2 1.1 06/23/2017    BILITOT 0.3 06/23/2017    ALKPHOS 109 06/23/2017    AST 18 06/23/2017    ALT 28 06/23/2017     Lipid Panel:  Lab Results   Component Value Date    CHOL 206 (H) 02/28/2017    TRIG 145 07/05/2017    HDL 53 07/05/2017    VLDL 29 07/05/2017    LDLCALC 116 (H) 02/28/2017    LDLHDL 2.04 02/28/2017     HbA1c:       Visual Acuity:  No exam data present    Age-appropriate Screening Schedule:  Refer to the list below for future screening recommendations based on patient's age, sex and/or medical conditions. Orders for these  recommended tests are listed in the plan section. The patient has been provided with a written plan.    Health Maintenance   Topic Date Due   • TDAP/TD VACCINES (1 - Tdap) 05/16/1945   • PNEUMOCOCCAL VACCINES (65+ LOW/MEDIUM RISK) (1 of 2 - PCV13) 05/16/1991   • ZOSTER VACCINE  05/04/2016   • MAMMOGRAM  05/23/2016   • INFLUENZA VACCINE  08/01/2017   • LIPID PANEL  07/05/2018        Subjective   History of Present Illness    Karena Moscoso is a 89 y.o. female who presents for an Subsequent Wellness Visit.    The following portions of the patient's history were reviewed and updated as appropriate: allergies, current medications, past family history, past medical history, past social history, past surgical history and problem list.    Outpatient Medications Prior to Visit   Medication Sig Dispense Refill   • amLODIPine (NORVASC) 10 MG tablet Take 1 tablet by mouth Daily. 90 tablet 3   • Ascorbic Acid (GNP VITAMIN C CR PO) Take  by mouth.     • aspirin 81 MG EC tablet Take 81 mg by mouth daily.     • atorvastatin (LIPITOR) 20 MG tablet Take 1 tablet by mouth Daily. 90 tablet 3   • Calcium Carbonate (CALCIUM 600 PO) Take  by mouth.     • carBAMazepine (CARBATROL) 200 MG 12 hr capsule Take 200 mg by mouth. Two capsules two times daily     • chlorthalidone (HYGROTON) 25 MG tablet Take 1 tablet by mouth Daily. 90 tablet 3   • diclofenac (VOLTAREN) 75 MG EC tablet Take 1 tablet by mouth 2 (Two) Times a Day. Take with food 180 tablet 3   • digoxin (LANOXIN) 125 MCG tablet Take 1 tablet by mouth Daily. 90 tablet 3   • lisinopril (PRINIVIL,ZESTRIL) 40 MG tablet Take 1 tablet by mouth 2 (Two) Times a Day. 180 tablet 3   • loperamide (IMODIUM) 2 MG capsule Take 2 mg by mouth 4 (Four) Times a Day As Needed for Diarrhea.     • Loratadine-Pseudoephedrine (CLARITIN-D 12 HOUR PO) Take  by mouth.     • meclizine (ANTIVERT) 25 MG tablet Take 1 tablet by mouth 3 (Three) Times a Day As Needed for dizziness. 90 tablet 3   • metoprolol  "tartrate (LOPRESSOR) 50 MG tablet Take 2 tablets each morning, 1 tablet each evening. 270 tablet 3   • mometasone (NASONEX) 50 MCG/ACT nasal spray 2 sprays into each nostril Daily. 3 each 3   • Multiple Vitamins-Minerals (ICAPS PLUS PO) Take  by mouth.       No facility-administered medications prior to visit.        Patient Active Problem List   Diagnosis   • Hyperlipidemia   • Essential hypertension   • Trigeminal neuralgia   • Palpitations   • Abnormal gait   • Macular degeneration       Advance Care Planning:  has an advance directive - a copy has been provided and is in file    Identification of Risk Factors:  Risk factors include: increased fall risk.    Review of Systems    Compared to one year ago, the patient feels her physical health is worse.  She moves a little slower and balance is not as good.  Compared to one year ago, the patient feels her mental health is the same.    Objective     Physical Exam    Vitals:    11/10/17 1047   BP: 152/78   Weight: 130 lb (59 kg)   Height: 61\" (154.9 cm)       Body mass index is 24.56 kg/(m^2).  Discussed the patient's BMI with her. The BMI is in the acceptable range.    Assessment/Plan   Patient Self-Management and Personalized Health Advice  The patient has been provided with information about: fall prevention and preventive services including:   · Influenza vaccine, Pneumococcal vaccine .    Visit Diagnoses:  No diagnosis found.    No orders of the defined types were placed in this encounter.      Outpatient Encounter Prescriptions as of 11/10/2017   Medication Sig Dispense Refill   • amLODIPine (NORVASC) 10 MG tablet Take 1 tablet by mouth Daily. 90 tablet 3   • Ascorbic Acid (GNP VITAMIN C CR PO) Take  by mouth.     • aspirin 81 MG EC tablet Take 81 mg by mouth daily.     • atorvastatin (LIPITOR) 20 MG tablet Take 1 tablet by mouth Daily. 90 tablet 3   • Calcium Carbonate (CALCIUM 600 PO) Take  by mouth.     • carBAMazepine (CARBATROL) 200 MG 12 hr capsule Take " 200 mg by mouth. Two capsules two times daily     • chlorthalidone (HYGROTON) 25 MG tablet Take 1 tablet by mouth Daily. 90 tablet 3   • diclofenac (VOLTAREN) 75 MG EC tablet Take 1 tablet by mouth 2 (Two) Times a Day. Take with food 180 tablet 3   • digoxin (LANOXIN) 125 MCG tablet Take 1 tablet by mouth Daily. 90 tablet 3   • lisinopril (PRINIVIL,ZESTRIL) 40 MG tablet Take 1 tablet by mouth 2 (Two) Times a Day. 180 tablet 3   • loperamide (IMODIUM) 2 MG capsule Take 2 mg by mouth 4 (Four) Times a Day As Needed for Diarrhea.     • Loratadine-Pseudoephedrine (CLARITIN-D 12 HOUR PO) Take  by mouth.     • meclizine (ANTIVERT) 25 MG tablet Take 1 tablet by mouth 3 (Three) Times a Day As Needed for dizziness. 90 tablet 3   • methotrexate 2.5 MG tablet 2.5 mg. 2 tablets on thursdays     • metoprolol tartrate (LOPRESSOR) 50 MG tablet Take 2 tablets each morning, 1 tablet each evening. 270 tablet 3   • mometasone (NASONEX) 50 MCG/ACT nasal spray 2 sprays into each nostril Daily. 3 each 3   • Multiple Vitamins-Minerals (ICAPS PLUS PO) Take  by mouth.       No facility-administered encounter medications on file as of 11/10/2017.        Reviewed use of high risk medication in the elderly: yes  Reviewed for potential of harmful drug interactions in the elderly: not applicable    Follow Up:  No Follow-up on file.     An After Visit Summary and PPPS with all of these plans were given to the patient.

## 2017-12-07 ENCOUNTER — HOSPITAL ENCOUNTER (INPATIENT)
Facility: HOSPITAL | Age: 82
LOS: 8 days | Discharge: NURSING, REHAB OR LONG-TERM CARE | End: 2017-12-15
Attending: EMERGENCY MEDICINE | Admitting: INTERNAL MEDICINE

## 2017-12-07 ENCOUNTER — APPOINTMENT (OUTPATIENT)
Dept: CT IMAGING | Facility: HOSPITAL | Age: 82
End: 2017-12-07

## 2017-12-07 ENCOUNTER — APPOINTMENT (OUTPATIENT)
Dept: GENERAL RADIOLOGY | Facility: HOSPITAL | Age: 82
End: 2017-12-07
Attending: EMERGENCY MEDICINE

## 2017-12-07 DIAGNOSIS — I63.9 ACUTE CVA (CEREBROVASCULAR ACCIDENT) (HCC): Primary | ICD-10-CM

## 2017-12-07 DIAGNOSIS — R26.89 DECREASED MOBILITY: ICD-10-CM

## 2017-12-07 DIAGNOSIS — I48.91 ATRIAL FIBRILLATION, UNSPECIFIED TYPE (HCC): ICD-10-CM

## 2017-12-07 LAB
ABO GROUP BLD: NORMAL
ALBUMIN SERPL-MCNC: 4.4 G/DL (ref 3.5–5.2)
ALBUMIN/GLOB SERPL: 1.3 G/DL
ALP SERPL-CCNC: 103 U/L (ref 39–117)
ALT SERPL W P-5'-P-CCNC: 21 U/L (ref 1–33)
ANION GAP SERPL CALCULATED.3IONS-SCNC: 11.9 MMOL/L
APTT PPP: 39 SECONDS (ref 22.7–35.4)
AST SERPL-CCNC: 19 U/L (ref 1–32)
BASOPHILS # BLD AUTO: 0.02 10*3/MM3 (ref 0–0.2)
BASOPHILS NFR BLD AUTO: 0.4 % (ref 0–1.5)
BILIRUB SERPL-MCNC: 0.2 MG/DL (ref 0.1–1.2)
BLD GP AB SCN SERPL QL: NEGATIVE
BUN BLD-MCNC: 39 MG/DL (ref 8–23)
BUN/CREAT SERPL: 43.8 (ref 7–25)
CALCIUM SPEC-SCNC: 10 MG/DL (ref 8.2–9.6)
CHLORIDE SERPL-SCNC: 100 MMOL/L (ref 98–107)
CO2 SERPL-SCNC: 29.1 MMOL/L (ref 22–29)
CREAT BLD-MCNC: 0.89 MG/DL (ref 0.57–1)
DEPRECATED RDW RBC AUTO: 54.8 FL (ref 37–54)
EOSINOPHIL # BLD AUTO: 0.16 10*3/MM3 (ref 0–0.7)
EOSINOPHIL NFR BLD AUTO: 2.9 % (ref 0.3–6.2)
ERYTHROCYTE [DISTWIDTH] IN BLOOD BY AUTOMATED COUNT: 15.7 % (ref 11.7–13)
GFR SERPL CREATININE-BSD FRML MDRD: 59 ML/MIN/1.73
GLOBULIN UR ELPH-MCNC: 3.4 GM/DL
GLUCOSE BLD-MCNC: 170 MG/DL (ref 65–99)
GLUCOSE BLDC GLUCOMTR-MCNC: 120 MG/DL (ref 70–130)
GLUCOSE BLDC GLUCOMTR-MCNC: 159 MG/DL (ref 70–130)
HCT VFR BLD AUTO: 39.7 % (ref 35.6–45.5)
HGB BLD-MCNC: 12.6 G/DL (ref 11.9–15.5)
HOLD SPECIMEN: NORMAL
HOLD SPECIMEN: NORMAL
IMM GRANULOCYTES # BLD: 0.02 10*3/MM3 (ref 0–0.03)
IMM GRANULOCYTES NFR BLD: 0.4 % (ref 0–0.5)
INR PPP: 0.98 (ref 0.9–1.1)
LYMPHOCYTES # BLD AUTO: 1.31 10*3/MM3 (ref 0.9–4.8)
LYMPHOCYTES NFR BLD AUTO: 23.4 % (ref 19.6–45.3)
MCH RBC QN AUTO: 30.4 PG (ref 26.9–32)
MCHC RBC AUTO-ENTMCNC: 31.7 G/DL (ref 32.4–36.3)
MCV RBC AUTO: 95.9 FL (ref 80.5–98.2)
MONOCYTES # BLD AUTO: 0.49 10*3/MM3 (ref 0.2–1.2)
MONOCYTES NFR BLD AUTO: 8.7 % (ref 5–12)
NEUTROPHILS # BLD AUTO: 3.61 10*3/MM3 (ref 1.9–8.1)
NEUTROPHILS NFR BLD AUTO: 64.2 % (ref 42.7–76)
PLATELET # BLD AUTO: 208 10*3/MM3 (ref 140–500)
PMV BLD AUTO: 11.9 FL (ref 6–12)
POTASSIUM BLD-SCNC: 4.1 MMOL/L (ref 3.5–5.2)
PROT SERPL-MCNC: 7.8 G/DL (ref 6–8.5)
PROTHROMBIN TIME: 12.6 SECONDS (ref 11.7–14.2)
RBC # BLD AUTO: 4.14 10*6/MM3 (ref 3.9–5.2)
RH BLD: POSITIVE
SODIUM BLD-SCNC: 141 MMOL/L (ref 136–145)
TROPONIN T SERPL-MCNC: 0.02 NG/ML (ref 0–0.03)
WBC NRBC COR # BLD: 5.61 10*3/MM3 (ref 4.5–10.7)
WHOLE BLOOD HOLD SPECIMEN: NORMAL
WHOLE BLOOD HOLD SPECIMEN: NORMAL

## 2017-12-07 PROCEDURE — 82962 GLUCOSE BLOOD TEST: CPT

## 2017-12-07 PROCEDURE — 70498 CT ANGIOGRAPHY NECK: CPT

## 2017-12-07 PROCEDURE — 71010 HC CHEST PA OR AP: CPT

## 2017-12-07 PROCEDURE — 85730 THROMBOPLASTIN TIME PARTIAL: CPT | Performed by: EMERGENCY MEDICINE

## 2017-12-07 PROCEDURE — 82565 ASSAY OF CREATININE: CPT

## 2017-12-07 PROCEDURE — 85025 COMPLETE CBC W/AUTO DIFF WBC: CPT | Performed by: EMERGENCY MEDICINE

## 2017-12-07 PROCEDURE — 93005 ELECTROCARDIOGRAM TRACING: CPT | Performed by: EMERGENCY MEDICINE

## 2017-12-07 PROCEDURE — 86850 RBC ANTIBODY SCREEN: CPT | Performed by: EMERGENCY MEDICINE

## 2017-12-07 PROCEDURE — 85610 PROTHROMBIN TIME: CPT | Performed by: EMERGENCY MEDICINE

## 2017-12-07 PROCEDURE — 84484 ASSAY OF TROPONIN QUANT: CPT | Performed by: EMERGENCY MEDICINE

## 2017-12-07 PROCEDURE — 86901 BLOOD TYPING SEROLOGIC RH(D): CPT | Performed by: EMERGENCY MEDICINE

## 2017-12-07 PROCEDURE — 80053 COMPREHEN METABOLIC PANEL: CPT | Performed by: EMERGENCY MEDICINE

## 2017-12-07 PROCEDURE — 99291 CRITICAL CARE FIRST HOUR: CPT

## 2017-12-07 PROCEDURE — 3E03317 INTRODUCTION OF OTHER THROMBOLYTIC INTO PERIPHERAL VEIN, PERCUTANEOUS APPROACH: ICD-10-PCS | Performed by: EMERGENCY MEDICINE

## 2017-12-07 PROCEDURE — 0042T HC CT CEREBRAL PERFUSION W/WO CONTRAST: CPT

## 2017-12-07 PROCEDURE — 25010000002 ALTEPLASE PER 1 MG

## 2017-12-07 PROCEDURE — 0 IOPAMIDOL PER 1 ML: Performed by: EMERGENCY MEDICINE

## 2017-12-07 PROCEDURE — 93010 ELECTROCARDIOGRAM REPORT: CPT | Performed by: INTERNAL MEDICINE

## 2017-12-07 PROCEDURE — 70496 CT ANGIOGRAPHY HEAD: CPT

## 2017-12-07 PROCEDURE — 86900 BLOOD TYPING SEROLOGIC ABO: CPT | Performed by: EMERGENCY MEDICINE

## 2017-12-07 PROCEDURE — 25010000002 ALTEPLASE PER 1 MG: Performed by: EMERGENCY MEDICINE

## 2017-12-07 RX ORDER — CARBAMAZEPINE 200 MG/1
400 TABLET, EXTENDED RELEASE ORAL 2 TIMES DAILY
COMMUNITY

## 2017-12-07 RX ORDER — SODIUM CHLORIDE 9 MG/ML
75 INJECTION, SOLUTION INTRAVENOUS CONTINUOUS
Status: DISCONTINUED | OUTPATIENT
Start: 2017-12-07 | End: 2017-12-15 | Stop reason: HOSPADM

## 2017-12-07 RX ORDER — ATORVASTATIN CALCIUM 80 MG/1
80 TABLET, FILM COATED ORAL NIGHTLY
Status: DISCONTINUED | OUTPATIENT
Start: 2017-12-07 | End: 2017-12-15 | Stop reason: HOSPADM

## 2017-12-07 RX ORDER — ASPIRIN 300 MG/1
300 SUPPOSITORY RECTAL DAILY
Status: DISCONTINUED | OUTPATIENT
Start: 2017-12-08 | End: 2017-12-15 | Stop reason: HOSPADM

## 2017-12-07 RX ORDER — ASPIRIN 325 MG
325 TABLET ORAL DAILY
Status: DISCONTINUED | OUTPATIENT
Start: 2017-12-08 | End: 2017-12-15 | Stop reason: HOSPADM

## 2017-12-07 RX ORDER — PHENOL 1.4 %
600 AEROSOL, SPRAY (ML) MUCOUS MEMBRANE DAILY
COMMUNITY

## 2017-12-07 RX ORDER — SODIUM CHLORIDE 0.9 % (FLUSH) 0.9 %
10 SYRINGE (ML) INJECTION AS NEEDED
Status: DISCONTINUED | OUTPATIENT
Start: 2017-12-07 | End: 2017-12-15 | Stop reason: HOSPADM

## 2017-12-07 RX ORDER — LABETALOL HYDROCHLORIDE 5 MG/ML
INJECTION, SOLUTION INTRAVENOUS
Status: COMPLETED | OUTPATIENT
Start: 2017-12-07 | End: 2017-12-07

## 2017-12-07 RX ORDER — METOPROLOL TARTRATE 50 MG/1
100 TABLET, FILM COATED ORAL EVERY MORNING
COMMUNITY

## 2017-12-07 RX ORDER — SODIUM CHLORIDE 0.9 % (FLUSH) 0.9 %
1-10 SYRINGE (ML) INJECTION AS NEEDED
Status: DISCONTINUED | OUTPATIENT
Start: 2017-12-07 | End: 2017-12-15 | Stop reason: HOSPADM

## 2017-12-07 RX ORDER — METOPROLOL TARTRATE 50 MG/1
50 TABLET, FILM COATED ORAL EVERY EVENING
COMMUNITY

## 2017-12-07 RX ORDER — SODIUM CHLORIDE 9 MG/ML
100 INJECTION, SOLUTION INTRAVENOUS ONCE
Status: DISCONTINUED | OUTPATIENT
Start: 2017-12-07 | End: 2017-12-10

## 2017-12-07 RX ADMIN — IOPAMIDOL 150 ML: 755 INJECTION, SOLUTION INTRAVENOUS at 19:47

## 2017-12-07 RX ADMIN — SODIUM CHLORIDE 75 ML/HR: 9 INJECTION, SOLUTION INTRAVENOUS at 20:25

## 2017-12-07 RX ADMIN — ALTEPLASE 48.7 MG: KIT at 19:54

## 2017-12-07 RX ADMIN — ALTEPLASE 5.4 MG: KIT at 19:43

## 2017-12-07 RX ADMIN — LABETALOL HYDROCHLORIDE 20 MG: 5 INJECTION, SOLUTION INTRAVENOUS at 19:31

## 2017-12-08 ENCOUNTER — APPOINTMENT (OUTPATIENT)
Dept: MRI IMAGING | Facility: HOSPITAL | Age: 82
End: 2017-12-08

## 2017-12-08 LAB
ANION GAP SERPL CALCULATED.3IONS-SCNC: 13 MMOL/L
BUN BLD-MCNC: 33 MG/DL (ref 8–23)
BUN/CREAT SERPL: 41.3 (ref 7–25)
CALCIUM SPEC-SCNC: 8.9 MG/DL (ref 8.2–9.6)
CHLORIDE SERPL-SCNC: 104 MMOL/L (ref 98–107)
CHOLEST SERPL-MCNC: 191 MG/DL (ref 0–200)
CO2 SERPL-SCNC: 25 MMOL/L (ref 22–29)
CREAT BLD-MCNC: 0.8 MG/DL (ref 0.57–1)
DEPRECATED RDW RBC AUTO: 52.6 FL (ref 37–54)
ERYTHROCYTE [DISTWIDTH] IN BLOOD BY AUTOMATED COUNT: 15.4 % (ref 11.7–13)
FIBRINOGEN PPP-MCNC: 282 MG/DL (ref 219–464)
GFR SERPL CREATININE-BSD FRML MDRD: 67 ML/MIN/1.73
GLUCOSE BLD-MCNC: 91 MG/DL (ref 65–99)
GLUCOSE BLDC GLUCOMTR-MCNC: 102 MG/DL (ref 70–130)
GLUCOSE BLDC GLUCOMTR-MCNC: 98 MG/DL (ref 70–130)
HBA1C MFR BLD: 5.69 % (ref 4.8–5.6)
HCT VFR BLD AUTO: 33.6 % (ref 35.6–45.5)
HDLC SERPL-MCNC: 47 MG/DL (ref 40–60)
HGB BLD-MCNC: 10.7 G/DL (ref 11.9–15.5)
LDLC SERPL CALC-MCNC: 112 MG/DL (ref 0–100)
LDLC/HDLC SERPL: 2.37 {RATIO}
MCH RBC QN AUTO: 30.1 PG (ref 26.9–32)
MCHC RBC AUTO-ENTMCNC: 31.8 G/DL (ref 32.4–36.3)
MCV RBC AUTO: 94.4 FL (ref 80.5–98.2)
PLATELET # BLD AUTO: 179 10*3/MM3 (ref 140–500)
PMV BLD AUTO: 11.2 FL (ref 6–12)
POTASSIUM BLD-SCNC: 3.8 MMOL/L (ref 3.5–5.2)
RBC # BLD AUTO: 3.56 10*6/MM3 (ref 3.9–5.2)
SODIUM BLD-SCNC: 142 MMOL/L (ref 136–145)
TRIGL SERPL-MCNC: 162 MG/DL (ref 0–150)
VLDLC SERPL-MCNC: 32.4 MG/DL (ref 5–40)
WBC NRBC COR # BLD: 5.94 10*3/MM3 (ref 4.5–10.7)

## 2017-12-08 PROCEDURE — 99223 1ST HOSP IP/OBS HIGH 75: CPT | Performed by: RADIOLOGY

## 2017-12-08 PROCEDURE — 0 GADOBENATE DIMEGLUMINE 529 MG/ML SOLUTION: Performed by: INTERNAL MEDICINE

## 2017-12-08 PROCEDURE — 80061 LIPID PANEL: CPT | Performed by: INTERNAL MEDICINE

## 2017-12-08 PROCEDURE — 92610 EVALUATE SWALLOWING FUNCTION: CPT

## 2017-12-08 PROCEDURE — 85384 FIBRINOGEN ACTIVITY: CPT | Performed by: RADIOLOGY

## 2017-12-08 PROCEDURE — 85027 COMPLETE CBC AUTOMATED: CPT | Performed by: INTERNAL MEDICINE

## 2017-12-08 PROCEDURE — 82962 GLUCOSE BLOOD TEST: CPT

## 2017-12-08 PROCEDURE — A9577 INJ MULTIHANCE: HCPCS | Performed by: INTERNAL MEDICINE

## 2017-12-08 PROCEDURE — 70553 MRI BRAIN STEM W/O & W/DYE: CPT

## 2017-12-08 PROCEDURE — 83036 HEMOGLOBIN GLYCOSYLATED A1C: CPT | Performed by: INTERNAL MEDICINE

## 2017-12-08 PROCEDURE — 80048 BASIC METABOLIC PNL TOTAL CA: CPT | Performed by: INTERNAL MEDICINE

## 2017-12-08 RX ORDER — CLOPIDOGREL BISULFATE 75 MG/1
75 TABLET ORAL DAILY
Status: DISCONTINUED | OUTPATIENT
Start: 2017-12-08 | End: 2017-12-09

## 2017-12-08 RX ADMIN — ASPIRIN 325 MG: 325 TABLET ORAL at 21:26

## 2017-12-08 RX ADMIN — GADOBENATE DIMEGLUMINE 10 ML: 529 INJECTION, SOLUTION INTRAVENOUS at 20:15

## 2017-12-08 RX ADMIN — ATORVASTATIN CALCIUM 80 MG: 80 TABLET, FILM COATED ORAL at 21:26

## 2017-12-08 RX ADMIN — GADOBENATE DIMEGLUMINE 10 ML: 529 INJECTION, SOLUTION INTRAVENOUS at 19:30

## 2017-12-08 RX ADMIN — CLOPIDOGREL 75 MG: 75 TABLET, FILM COATED ORAL at 22:58

## 2017-12-08 RX ADMIN — SODIUM CHLORIDE 75 ML/HR: 9 INJECTION, SOLUTION INTRAVENOUS at 08:50

## 2017-12-08 RX ADMIN — SODIUM CHLORIDE 75 ML/HR: 9 INJECTION, SOLUTION INTRAVENOUS at 23:31

## 2017-12-09 ENCOUNTER — APPOINTMENT (OUTPATIENT)
Dept: GENERAL RADIOLOGY | Facility: HOSPITAL | Age: 82
End: 2017-12-09

## 2017-12-09 ENCOUNTER — APPOINTMENT (OUTPATIENT)
Dept: CARDIOLOGY | Facility: HOSPITAL | Age: 82
End: 2017-12-09
Attending: RADIOLOGY

## 2017-12-09 PROBLEM — I05.0 MITRAL STENOSIS: Chronic | Status: ACTIVE | Noted: 2017-12-09

## 2017-12-09 PROBLEM — I09.9 RHEUMATIC HEART DISEASE: Chronic | Status: ACTIVE | Noted: 2017-12-09

## 2017-12-09 LAB
ASCENDING AORTA: 2.7 CM
BH CV ECHO MEAS - ACS: 1.1 CM
BH CV ECHO MEAS - AO MAX PG (FULL): 8.8 MMHG
BH CV ECHO MEAS - AO MAX PG: 27.7 MMHG
BH CV ECHO MEAS - AO MEAN PG (FULL): 3 MMHG
BH CV ECHO MEAS - AO MEAN PG: 13 MMHG
BH CV ECHO MEAS - AO ROOT AREA (BSA CORRECTED): 1.7
BH CV ECHO MEAS - AO ROOT AREA: 5.7 CM^2
BH CV ECHO MEAS - AO ROOT DIAM: 2.7 CM
BH CV ECHO MEAS - AO V2 MAX: 263 CM/SEC
BH CV ECHO MEAS - AO V2 MEAN: 163 CM/SEC
BH CV ECHO MEAS - AO V2 VTI: 44.7 CM
BH CV ECHO MEAS - ASC AORTA: 3.2 CM
BH CV ECHO MEAS - AVA(I,A): 2.8 CM^2
BH CV ECHO MEAS - AVA(I,D): 2.8 CM^2
BH CV ECHO MEAS - AVA(V,A): 2.6 CM^2
BH CV ECHO MEAS - AVA(V,D): 2.6 CM^2
BH CV ECHO MEAS - BSA(HAYCOCK): 1.6 M^2
BH CV ECHO MEAS - BSA: 1.6 M^2
BH CV ECHO MEAS - BZI_BMI: 24.2 KILOGRAMS/M^2
BH CV ECHO MEAS - BZI_METRIC_HEIGHT: 154.9 CM
BH CV ECHO MEAS - BZI_METRIC_WEIGHT: 58.1 KG
BH CV ECHO MEAS - CONTRAST EF (2CH): 69.1 ML/M^2
BH CV ECHO MEAS - CONTRAST EF 4CH: 62.5 ML/M^2
BH CV ECHO MEAS - EDV(CUBED): 79.5 ML
BH CV ECHO MEAS - EDV(MOD-SP2): 68 ML
BH CV ECHO MEAS - EDV(MOD-SP4): 80 ML
BH CV ECHO MEAS - EDV(TEICH): 83.1 ML
BH CV ECHO MEAS - EF(CUBED): 77.9 %
BH CV ECHO MEAS - EF(MOD-SP2): 69.1 %
BH CV ECHO MEAS - EF(MOD-SP4): 62.5 %
BH CV ECHO MEAS - EF(TEICH): 70.4 %
BH CV ECHO MEAS - ESV(CUBED): 17.6 ML
BH CV ECHO MEAS - ESV(MOD-SP2): 21 ML
BH CV ECHO MEAS - ESV(MOD-SP4): 30 ML
BH CV ECHO MEAS - ESV(TEICH): 24.6 ML
BH CV ECHO MEAS - FS: 39.5 %
BH CV ECHO MEAS - IVS/LVPW: 0.79
BH CV ECHO MEAS - IVSD: 1.1 CM
BH CV ECHO MEAS - LAT PEAK E' VEL: 4 CM/SEC
BH CV ECHO MEAS - LV DIASTOLIC VOL/BSA (35-75): 51.2 ML/M^2
BH CV ECHO MEAS - LV MASS(C)D: 196.1 GRAMS
BH CV ECHO MEAS - LV MASS(C)DI: 125.5 GRAMS/M^2
BH CV ECHO MEAS - LV MAX PG: 18.8 MMHG
BH CV ECHO MEAS - LV MEAN PG: 10 MMHG
BH CV ECHO MEAS - LV SYSTOLIC VOL/BSA (12-30): 19.2 ML/M^2
BH CV ECHO MEAS - LV V1 MAX: 217 CM/SEC
BH CV ECHO MEAS - LV V1 MEAN: 149 CM/SEC
BH CV ECHO MEAS - LV V1 VTI: 39.3 CM
BH CV ECHO MEAS - LVIDD: 4.3 CM
BH CV ECHO MEAS - LVIDS: 2.6 CM
BH CV ECHO MEAS - LVLD AP2: 6.9 CM
BH CV ECHO MEAS - LVLD AP4: 8 CM
BH CV ECHO MEAS - LVLS AP2: 6.3 CM
BH CV ECHO MEAS - LVLS AP4: 6.6 CM
BH CV ECHO MEAS - LVOT AREA (M): 3.1 CM^2
BH CV ECHO MEAS - LVOT AREA: 3.1 CM^2
BH CV ECHO MEAS - LVOT DIAM: 2 CM
BH CV ECHO MEAS - LVPWD: 1.4 CM
BH CV ECHO MEAS - MED PEAK E' VEL: 3 CM/SEC
BH CV ECHO MEAS - MR MAX PG: 136.4 MMHG
BH CV ECHO MEAS - MR MAX VEL: 584 CM/SEC
BH CV ECHO MEAS - MV A DUR: 0.13 SEC
BH CV ECHO MEAS - MV A MAX VEL: 187 CM/SEC
BH CV ECHO MEAS - MV DEC SLOPE: 482 CM/SEC^2
BH CV ECHO MEAS - MV DEC TIME: 0.24 SEC
BH CV ECHO MEAS - MV E MAX VEL: 113 CM/SEC
BH CV ECHO MEAS - MV E/A: 0.6
BH CV ECHO MEAS - MV MAX PG: 19.9 MMHG
BH CV ECHO MEAS - MV MEAN PG: 7 MMHG
BH CV ECHO MEAS - MV P1/2T MAX VEL: 137 CM/SEC
BH CV ECHO MEAS - MV P1/2T: 83.2 MSEC
BH CV ECHO MEAS - MV V2 MAX: 223 CM/SEC
BH CV ECHO MEAS - MV V2 MEAN: 118 CM/SEC
BH CV ECHO MEAS - MV V2 VTI: 47.4 CM
BH CV ECHO MEAS - MVA P1/2T LCG: 1.6 CM^2
BH CV ECHO MEAS - MVA(P1/2T): 2.6 CM^2
BH CV ECHO MEAS - MVA(VTI): 2.6 CM^2
BH CV ECHO MEAS - PA ACC TIME: 0.12 SEC
BH CV ECHO MEAS - PA MAX PG (FULL): 1.4 MMHG
BH CV ECHO MEAS - PA MAX PG: 10.6 MMHG
BH CV ECHO MEAS - PA PR(ACCEL): 26.8 MMHG
BH CV ECHO MEAS - PA V2 MAX: 163 CM/SEC
BH CV ECHO MEAS - PULM A REVS DUR: 0.18 SEC
BH CV ECHO MEAS - PULM A REVS VEL: 28.8 CM/SEC
BH CV ECHO MEAS - PULM DIAS VEL: 28.8 CM/SEC
BH CV ECHO MEAS - PULM S/D: 1.5
BH CV ECHO MEAS - PULM SYS VEL: 43.7 CM/SEC
BH CV ECHO MEAS - PVA(V,A): 1.9 CM^2
BH CV ECHO MEAS - PVA(V,D): 1.9 CM^2
BH CV ECHO MEAS - QP/QS: 0.46
BH CV ECHO MEAS - RAP SYSTOLE: 3 MMHG
BH CV ECHO MEAS - RV MAX PG: 9.2 MMHG
BH CV ECHO MEAS - RV MEAN PG: 5 MMHG
BH CV ECHO MEAS - RV V1 MAX: 152 CM/SEC
BH CV ECHO MEAS - RV V1 MEAN: 96.7 CM/SEC
BH CV ECHO MEAS - RV V1 VTI: 28.4 CM
BH CV ECHO MEAS - RVOT AREA: 2 CM^2
BH CV ECHO MEAS - RVOT DIAM: 1.6 CM
BH CV ECHO MEAS - RVSP: 51 MMHG
BH CV ECHO MEAS - SI(AO): 163.8 ML/M^2
BH CV ECHO MEAS - SI(CUBED): 39.6 ML/M^2
BH CV ECHO MEAS - SI(LVOT): 79 ML/M^2
BH CV ECHO MEAS - SI(MOD-SP2): 30.1 ML/M^2
BH CV ECHO MEAS - SI(MOD-SP4): 32 ML/M^2
BH CV ECHO MEAS - SI(TEICH): 37.4 ML/M^2
BH CV ECHO MEAS - SUP REN AO DIAM: 1.7 CM
BH CV ECHO MEAS - SV(AO): 255.9 ML
BH CV ECHO MEAS - SV(CUBED): 61.9 ML
BH CV ECHO MEAS - SV(LVOT): 123.5 ML
BH CV ECHO MEAS - SV(MOD-SP2): 47 ML
BH CV ECHO MEAS - SV(MOD-SP4): 50 ML
BH CV ECHO MEAS - SV(RVOT): 57.1 ML
BH CV ECHO MEAS - SV(TEICH): 58.5 ML
BH CV ECHO MEAS - TAPSE (>1.6): 2.2 CM2
BH CV ECHO MEAS - TR MAX VEL: 347 CM/SEC
BH CV VAS BP RIGHT ARM: NORMAL MMHG
BH CV XLRA - RV BASE: 3 CM
BH CV XLRA - TDI S': 14 CM/SEC
E/E' RATIO: 30
INR PPP: 1.06 (ref 0.9–1.1)
LEFT ATRIUM VOLUME INDEX: 61 ML/M2
LV EF 2D ECHO EST: 63 %
PROTHROMBIN TIME: 13.4 SECONDS (ref 11.7–14.2)

## 2017-12-09 PROCEDURE — 99233 SBSQ HOSP IP/OBS HIGH 50: CPT | Performed by: NURSE PRACTITIONER

## 2017-12-09 PROCEDURE — 92611 MOTION FLUOROSCOPY/SWALLOW: CPT

## 2017-12-09 PROCEDURE — 93306 TTE W/DOPPLER COMPLETE: CPT | Performed by: INTERNAL MEDICINE

## 2017-12-09 PROCEDURE — 97110 THERAPEUTIC EXERCISES: CPT

## 2017-12-09 PROCEDURE — 97162 PT EVAL MOD COMPLEX 30 MIN: CPT

## 2017-12-09 PROCEDURE — 93306 TTE W/DOPPLER COMPLETE: CPT

## 2017-12-09 PROCEDURE — 74230 X-RAY XM SWLNG FUNCJ C+: CPT

## 2017-12-09 PROCEDURE — 99221 1ST HOSP IP/OBS SF/LOW 40: CPT | Performed by: INTERNAL MEDICINE

## 2017-12-09 PROCEDURE — 85610 PROTHROMBIN TIME: CPT | Performed by: INTERNAL MEDICINE

## 2017-12-09 RX ORDER — METOPROLOL TARTRATE 100 MG/1
100 TABLET ORAL EVERY MORNING
Status: DISCONTINUED | OUTPATIENT
Start: 2017-12-09 | End: 2017-12-15 | Stop reason: HOSPADM

## 2017-12-09 RX ORDER — METOPROLOL TARTRATE 50 MG/1
50 TABLET, FILM COATED ORAL NIGHTLY
Status: DISCONTINUED | OUTPATIENT
Start: 2017-12-09 | End: 2017-12-15 | Stop reason: HOSPADM

## 2017-12-09 RX ORDER — WARFARIN SODIUM 5 MG/1
5 TABLET ORAL
Status: DISCONTINUED | OUTPATIENT
Start: 2017-12-09 | End: 2017-12-11

## 2017-12-09 RX ORDER — CARBAMAZEPINE 200 MG/1
400 CAPSULE, EXTENDED RELEASE ORAL EVERY 12 HOURS SCHEDULED
Status: DISCONTINUED | OUTPATIENT
Start: 2017-12-09 | End: 2017-12-15 | Stop reason: HOSPADM

## 2017-12-09 RX ORDER — LISINOPRIL 40 MG/1
40 TABLET ORAL
Status: DISCONTINUED | OUTPATIENT
Start: 2017-12-09 | End: 2017-12-13

## 2017-12-09 RX ORDER — DIGOXIN 250 MCG
250 TABLET ORAL
Status: DISCONTINUED | OUTPATIENT
Start: 2017-12-09 | End: 2017-12-15 | Stop reason: HOSPADM

## 2017-12-09 RX ADMIN — WARFARIN SODIUM 5 MG: 5 TABLET ORAL at 17:02

## 2017-12-09 RX ADMIN — METOPROLOL TARTRATE 100 MG: 100 TABLET, FILM COATED ORAL at 13:44

## 2017-12-09 RX ADMIN — METOPROLOL TARTRATE 50 MG: 50 TABLET, FILM COATED ORAL at 20:09

## 2017-12-09 RX ADMIN — CARBAMAZEPINE 400 MG: 100 CAPSULE, EXTENDED RELEASE ORAL at 13:46

## 2017-12-09 RX ADMIN — LISINOPRIL 40 MG: 40 TABLET ORAL at 13:45

## 2017-12-09 RX ADMIN — DIGOXIN 250 MCG: 250 TABLET ORAL at 13:45

## 2017-12-09 RX ADMIN — ATORVASTATIN CALCIUM 80 MG: 80 TABLET, FILM COATED ORAL at 20:08

## 2017-12-09 RX ADMIN — CARBAMAZEPINE 400 MG: 100 CAPSULE, EXTENDED RELEASE ORAL at 20:08

## 2017-12-10 LAB
DIGOXIN SERPL-MCNC: 0.8 NG/ML (ref 0.6–1.2)
INR PPP: 1.1 (ref 0.9–1.1)
PROTHROMBIN TIME: 13.7 SECONDS (ref 11.7–14.2)
T4 FREE SERPL-MCNC: 1.15 NG/DL (ref 0.93–1.7)
TSH SERPL DL<=0.05 MIU/L-ACNC: 3.24 MIU/ML (ref 0.27–4.2)

## 2017-12-10 PROCEDURE — 99223 1ST HOSP IP/OBS HIGH 75: CPT | Performed by: INTERNAL MEDICINE

## 2017-12-10 PROCEDURE — 99233 SBSQ HOSP IP/OBS HIGH 50: CPT | Performed by: NURSE PRACTITIONER

## 2017-12-10 PROCEDURE — 84439 ASSAY OF FREE THYROXINE: CPT | Performed by: INTERNAL MEDICINE

## 2017-12-10 PROCEDURE — 99232 SBSQ HOSP IP/OBS MODERATE 35: CPT | Performed by: INTERNAL MEDICINE

## 2017-12-10 PROCEDURE — 80162 ASSAY OF DIGOXIN TOTAL: CPT | Performed by: INTERNAL MEDICINE

## 2017-12-10 PROCEDURE — 84443 ASSAY THYROID STIM HORMONE: CPT | Performed by: INTERNAL MEDICINE

## 2017-12-10 PROCEDURE — 85610 PROTHROMBIN TIME: CPT | Performed by: INTERNAL MEDICINE

## 2017-12-10 PROCEDURE — 97110 THERAPEUTIC EXERCISES: CPT

## 2017-12-10 RX ORDER — AMLODIPINE BESYLATE 10 MG/1
10 TABLET ORAL
Status: DISCONTINUED | OUTPATIENT
Start: 2017-12-10 | End: 2017-12-15 | Stop reason: HOSPADM

## 2017-12-10 RX ORDER — CHLORTHALIDONE 25 MG/1
25 TABLET ORAL DAILY
Status: DISCONTINUED | OUTPATIENT
Start: 2017-12-10 | End: 2017-12-15 | Stop reason: HOSPADM

## 2017-12-10 RX ORDER — ACETAMINOPHEN 325 MG/1
650 TABLET ORAL EVERY 6 HOURS PRN
Status: DISCONTINUED | OUTPATIENT
Start: 2017-12-10 | End: 2017-12-15 | Stop reason: HOSPADM

## 2017-12-10 RX ADMIN — AMLODIPINE BESYLATE 10 MG: 10 TABLET ORAL at 12:23

## 2017-12-10 RX ADMIN — METOPROLOL TARTRATE 100 MG: 100 TABLET, FILM COATED ORAL at 07:02

## 2017-12-10 RX ADMIN — ACETAMINOPHEN 650 MG: 325 TABLET ORAL at 03:23

## 2017-12-10 RX ADMIN — METOPROLOL TARTRATE 50 MG: 50 TABLET, FILM COATED ORAL at 21:44

## 2017-12-10 RX ADMIN — ACETAMINOPHEN 650 MG: 325 TABLET ORAL at 17:08

## 2017-12-10 RX ADMIN — CARBAMAZEPINE 400 MG: 100 CAPSULE, EXTENDED RELEASE ORAL at 09:43

## 2017-12-10 RX ADMIN — ATORVASTATIN CALCIUM 80 MG: 80 TABLET, FILM COATED ORAL at 21:44

## 2017-12-10 RX ADMIN — ACETAMINOPHEN 650 MG: 325 TABLET ORAL at 09:43

## 2017-12-10 RX ADMIN — CARBAMAZEPINE 400 MG: 100 CAPSULE, EXTENDED RELEASE ORAL at 21:44

## 2017-12-10 RX ADMIN — ASPIRIN 325 MG: 325 TABLET ORAL at 09:43

## 2017-12-10 RX ADMIN — WARFARIN SODIUM 5 MG: 5 TABLET ORAL at 17:01

## 2017-12-10 RX ADMIN — CHLORTHALIDONE 25 MG: 25 TABLET ORAL at 12:23

## 2017-12-10 RX ADMIN — SODIUM CHLORIDE 75 ML/HR: 9 INJECTION, SOLUTION INTRAVENOUS at 01:45

## 2017-12-10 RX ADMIN — LISINOPRIL 40 MG: 40 TABLET ORAL at 12:23

## 2017-12-10 RX ADMIN — DIGOXIN 250 MCG: 250 TABLET ORAL at 12:23

## 2017-12-11 LAB
BASOPHILS # BLD AUTO: 0.04 10*3/MM3 (ref 0–0.2)
BASOPHILS NFR BLD AUTO: 0.7 % (ref 0–1.5)
CREAT BLDA-MCNC: 0.9 MG/DL (ref 0.6–1.3)
DEPRECATED RDW RBC AUTO: 52.4 FL (ref 37–54)
EOSINOPHIL # BLD AUTO: 0.18 10*3/MM3 (ref 0–0.7)
EOSINOPHIL NFR BLD AUTO: 2.9 % (ref 0.3–6.2)
ERYTHROCYTE [DISTWIDTH] IN BLOOD BY AUTOMATED COUNT: 15.1 % (ref 11.7–13)
HCT VFR BLD AUTO: 35.7 % (ref 35.6–45.5)
HGB BLD-MCNC: 11.3 G/DL (ref 11.9–15.5)
IMM GRANULOCYTES # BLD: 0.02 10*3/MM3 (ref 0–0.03)
IMM GRANULOCYTES NFR BLD: 0.3 % (ref 0–0.5)
INR PPP: 1.19 (ref 0.9–1.1)
LYMPHOCYTES # BLD AUTO: 1.22 10*3/MM3 (ref 0.9–4.8)
LYMPHOCYTES NFR BLD AUTO: 19.9 % (ref 19.6–45.3)
MCH RBC QN AUTO: 30.5 PG (ref 26.9–32)
MCHC RBC AUTO-ENTMCNC: 31.7 G/DL (ref 32.4–36.3)
MCV RBC AUTO: 96.2 FL (ref 80.5–98.2)
MONOCYTES # BLD AUTO: 0.71 10*3/MM3 (ref 0.2–1.2)
MONOCYTES NFR BLD AUTO: 11.6 % (ref 5–12)
NEUTROPHILS # BLD AUTO: 3.96 10*3/MM3 (ref 1.9–8.1)
NEUTROPHILS NFR BLD AUTO: 64.6 % (ref 42.7–76)
NRBC BLD MANUAL-RTO: 0 /100 WBC (ref 0–0)
PLATELET # BLD AUTO: 159 10*3/MM3 (ref 140–500)
PMV BLD AUTO: 11.3 FL (ref 6–12)
PROTHROMBIN TIME: 14.7 SECONDS (ref 11.7–14.2)
RBC # BLD AUTO: 3.71 10*6/MM3 (ref 3.9–5.2)
WBC NRBC COR # BLD: 6.13 10*3/MM3 (ref 4.5–10.7)

## 2017-12-11 PROCEDURE — 97165 OT EVAL LOW COMPLEX 30 MIN: CPT | Performed by: OCCUPATIONAL THERAPIST

## 2017-12-11 PROCEDURE — 99233 SBSQ HOSP IP/OBS HIGH 50: CPT | Performed by: INTERNAL MEDICINE

## 2017-12-11 PROCEDURE — 97535 SELF CARE MNGMENT TRAINING: CPT | Performed by: OCCUPATIONAL THERAPIST

## 2017-12-11 PROCEDURE — 85025 COMPLETE CBC W/AUTO DIFF WBC: CPT | Performed by: INTERNAL MEDICINE

## 2017-12-11 PROCEDURE — 99232 SBSQ HOSP IP/OBS MODERATE 35: CPT | Performed by: INTERNAL MEDICINE

## 2017-12-11 PROCEDURE — 85610 PROTHROMBIN TIME: CPT | Performed by: INTERNAL MEDICINE

## 2017-12-11 PROCEDURE — 97110 THERAPEUTIC EXERCISES: CPT

## 2017-12-11 RX ORDER — IBUPROFEN 400 MG/1
400 TABLET ORAL EVERY 8 HOURS PRN
Status: DISCONTINUED | OUTPATIENT
Start: 2017-12-11 | End: 2017-12-15 | Stop reason: HOSPADM

## 2017-12-11 RX ORDER — WARFARIN SODIUM 7.5 MG/1
7.5 TABLET ORAL
Status: DISCONTINUED | OUTPATIENT
Start: 2017-12-11 | End: 2017-12-15

## 2017-12-11 RX ORDER — PANTOPRAZOLE SODIUM 40 MG/1
40 TABLET, DELAYED RELEASE ORAL
Status: DISCONTINUED | OUTPATIENT
Start: 2017-12-12 | End: 2017-12-15 | Stop reason: HOSPADM

## 2017-12-11 RX ADMIN — AMLODIPINE BESYLATE 10 MG: 10 TABLET ORAL at 08:28

## 2017-12-11 RX ADMIN — IBUPROFEN 400 MG: 400 TABLET ORAL at 20:41

## 2017-12-11 RX ADMIN — ACETAMINOPHEN 650 MG: 325 TABLET ORAL at 14:31

## 2017-12-11 RX ADMIN — CARBAMAZEPINE 400 MG: 100 CAPSULE, EXTENDED RELEASE ORAL at 08:28

## 2017-12-11 RX ADMIN — ASPIRIN 325 MG: 325 TABLET ORAL at 08:29

## 2017-12-11 RX ADMIN — WARFARIN SODIUM 7.5 MG: 7.5 TABLET ORAL at 17:24

## 2017-12-11 RX ADMIN — METOPROLOL TARTRATE 50 MG: 50 TABLET, FILM COATED ORAL at 20:41

## 2017-12-11 RX ADMIN — METOPROLOL TARTRATE 100 MG: 100 TABLET, FILM COATED ORAL at 08:29

## 2017-12-11 RX ADMIN — ATORVASTATIN CALCIUM 80 MG: 80 TABLET, FILM COATED ORAL at 20:41

## 2017-12-11 RX ADMIN — ACETAMINOPHEN 650 MG: 325 TABLET ORAL at 00:04

## 2017-12-11 RX ADMIN — DIGOXIN 250 MCG: 250 TABLET ORAL at 14:29

## 2017-12-11 RX ADMIN — CHLORTHALIDONE 25 MG: 25 TABLET ORAL at 08:28

## 2017-12-11 RX ADMIN — LISINOPRIL 40 MG: 40 TABLET ORAL at 08:28

## 2017-12-11 RX ADMIN — CARBAMAZEPINE 400 MG: 100 CAPSULE, EXTENDED RELEASE ORAL at 20:41

## 2017-12-11 RX ADMIN — ACETAMINOPHEN 650 MG: 325 TABLET ORAL at 08:28

## 2017-12-12 LAB
BASOPHILS # BLD AUTO: 0.04 10*3/MM3 (ref 0–0.2)
BASOPHILS NFR BLD AUTO: 0.6 % (ref 0–1.5)
DEPRECATED RDW RBC AUTO: 53.6 FL (ref 37–54)
EOSINOPHIL # BLD AUTO: 0.19 10*3/MM3 (ref 0–0.7)
EOSINOPHIL NFR BLD AUTO: 3 % (ref 0.3–6.2)
ERYTHROCYTE [DISTWIDTH] IN BLOOD BY AUTOMATED COUNT: 15.1 % (ref 11.7–13)
HCT VFR BLD AUTO: 35.2 % (ref 35.6–45.5)
HGB BLD-MCNC: 10.9 G/DL (ref 11.9–15.5)
IMM GRANULOCYTES # BLD: 0.02 10*3/MM3 (ref 0–0.03)
IMM GRANULOCYTES NFR BLD: 0.3 % (ref 0–0.5)
INR PPP: 1.41 (ref 0.9–1.1)
LYMPHOCYTES # BLD AUTO: 1.12 10*3/MM3 (ref 0.9–4.8)
LYMPHOCYTES NFR BLD AUTO: 17.7 % (ref 19.6–45.3)
MCH RBC QN AUTO: 29.9 PG (ref 26.9–32)
MCHC RBC AUTO-ENTMCNC: 31 G/DL (ref 32.4–36.3)
MCV RBC AUTO: 96.7 FL (ref 80.5–98.2)
MONOCYTES # BLD AUTO: 0.77 10*3/MM3 (ref 0.2–1.2)
MONOCYTES NFR BLD AUTO: 12.2 % (ref 5–12)
NEUTROPHILS # BLD AUTO: 4.17 10*3/MM3 (ref 1.9–8.1)
NEUTROPHILS NFR BLD AUTO: 66.2 % (ref 42.7–76)
PLATELET # BLD AUTO: 169 10*3/MM3 (ref 140–500)
PMV BLD AUTO: 11.2 FL (ref 6–12)
PROTHROMBIN TIME: 16.7 SECONDS (ref 11.7–14.2)
RBC # BLD AUTO: 3.64 10*6/MM3 (ref 3.9–5.2)
WBC NRBC COR # BLD: 6.31 10*3/MM3 (ref 4.5–10.7)

## 2017-12-12 PROCEDURE — 97110 THERAPEUTIC EXERCISES: CPT

## 2017-12-12 PROCEDURE — 85610 PROTHROMBIN TIME: CPT | Performed by: INTERNAL MEDICINE

## 2017-12-12 PROCEDURE — 92526 ORAL FUNCTION THERAPY: CPT

## 2017-12-12 PROCEDURE — 99231 SBSQ HOSP IP/OBS SF/LOW 25: CPT | Performed by: INTERNAL MEDICINE

## 2017-12-12 PROCEDURE — 85025 COMPLETE CBC W/AUTO DIFF WBC: CPT | Performed by: INTERNAL MEDICINE

## 2017-12-12 RX ADMIN — PANTOPRAZOLE SODIUM 40 MG: 40 TABLET, DELAYED RELEASE ORAL at 08:00

## 2017-12-12 RX ADMIN — ACETAMINOPHEN 650 MG: 325 TABLET ORAL at 15:48

## 2017-12-12 RX ADMIN — AMLODIPINE BESYLATE 10 MG: 10 TABLET ORAL at 08:00

## 2017-12-12 RX ADMIN — DIGOXIN 250 MCG: 250 TABLET ORAL at 12:05

## 2017-12-12 RX ADMIN — LISINOPRIL 40 MG: 40 TABLET ORAL at 08:00

## 2017-12-12 RX ADMIN — ASPIRIN 325 MG: 325 TABLET ORAL at 08:01

## 2017-12-12 RX ADMIN — CHLORTHALIDONE 25 MG: 25 TABLET ORAL at 08:01

## 2017-12-12 RX ADMIN — METOPROLOL TARTRATE 50 MG: 50 TABLET, FILM COATED ORAL at 20:30

## 2017-12-12 RX ADMIN — IBUPROFEN 400 MG: 400 TABLET ORAL at 20:30

## 2017-12-12 RX ADMIN — ACETAMINOPHEN 650 MG: 325 TABLET ORAL at 03:07

## 2017-12-12 RX ADMIN — CARBAMAZEPINE 400 MG: 100 CAPSULE, EXTENDED RELEASE ORAL at 20:30

## 2017-12-12 RX ADMIN — IBUPROFEN 400 MG: 400 TABLET ORAL at 08:01

## 2017-12-12 RX ADMIN — CARBAMAZEPINE 400 MG: 100 CAPSULE, EXTENDED RELEASE ORAL at 08:01

## 2017-12-12 RX ADMIN — WARFARIN SODIUM 7.5 MG: 7.5 TABLET ORAL at 18:19

## 2017-12-12 RX ADMIN — METOPROLOL TARTRATE 100 MG: 100 TABLET, FILM COATED ORAL at 08:01

## 2017-12-12 RX ADMIN — ATORVASTATIN CALCIUM 80 MG: 80 TABLET, FILM COATED ORAL at 20:30

## 2017-12-13 LAB
ANION GAP SERPL CALCULATED.3IONS-SCNC: 12.6 MMOL/L
BASOPHILS # BLD AUTO: 0.04 10*3/MM3 (ref 0–0.2)
BASOPHILS NFR BLD AUTO: 0.8 % (ref 0–1.5)
BUN BLD-MCNC: 29 MG/DL (ref 8–23)
BUN/CREAT SERPL: 36.3 (ref 7–25)
CALCIUM SPEC-SCNC: 8.6 MG/DL (ref 8.2–9.6)
CHLORIDE SERPL-SCNC: 104 MMOL/L (ref 98–107)
CO2 SERPL-SCNC: 27.4 MMOL/L (ref 22–29)
CREAT BLD-MCNC: 0.8 MG/DL (ref 0.57–1)
DEPRECATED RDW RBC AUTO: 52.5 FL (ref 37–54)
EOSINOPHIL # BLD AUTO: 0.13 10*3/MM3 (ref 0–0.7)
EOSINOPHIL NFR BLD AUTO: 2.7 % (ref 0.3–6.2)
ERYTHROCYTE [DISTWIDTH] IN BLOOD BY AUTOMATED COUNT: 15 % (ref 11.7–13)
GFR SERPL CREATININE-BSD FRML MDRD: 67 ML/MIN/1.73
GLUCOSE BLD-MCNC: 108 MG/DL (ref 65–99)
HCT VFR BLD AUTO: 34.7 % (ref 35.6–45.5)
HGB BLD-MCNC: 10.9 G/DL (ref 11.9–15.5)
IMM GRANULOCYTES # BLD: 0 10*3/MM3 (ref 0–0.03)
IMM GRANULOCYTES NFR BLD: 0 % (ref 0–0.5)
INR PPP: 1.66 (ref 0.9–1.1)
LYMPHOCYTES # BLD AUTO: 1.09 10*3/MM3 (ref 0.9–4.8)
LYMPHOCYTES NFR BLD AUTO: 22.7 % (ref 19.6–45.3)
MCH RBC QN AUTO: 30 PG (ref 26.9–32)
MCHC RBC AUTO-ENTMCNC: 31.4 G/DL (ref 32.4–36.3)
MCV RBC AUTO: 95.6 FL (ref 80.5–98.2)
MONOCYTES # BLD AUTO: 0.56 10*3/MM3 (ref 0.2–1.2)
MONOCYTES NFR BLD AUTO: 11.7 % (ref 5–12)
NEUTROPHILS # BLD AUTO: 2.98 10*3/MM3 (ref 1.9–8.1)
NEUTROPHILS NFR BLD AUTO: 62.1 % (ref 42.7–76)
PLATELET # BLD AUTO: 200 10*3/MM3 (ref 140–500)
PMV BLD AUTO: 11.7 FL (ref 6–12)
POTASSIUM BLD-SCNC: 3.8 MMOL/L (ref 3.5–5.2)
PROTHROMBIN TIME: 19 SECONDS (ref 11.7–14.2)
RBC # BLD AUTO: 3.63 10*6/MM3 (ref 3.9–5.2)
SODIUM BLD-SCNC: 144 MMOL/L (ref 136–145)
WBC NRBC COR # BLD: 4.8 10*3/MM3 (ref 4.5–10.7)

## 2017-12-13 PROCEDURE — 80048 BASIC METABOLIC PNL TOTAL CA: CPT | Performed by: NURSE PRACTITIONER

## 2017-12-13 PROCEDURE — 85025 COMPLETE CBC W/AUTO DIFF WBC: CPT | Performed by: INTERNAL MEDICINE

## 2017-12-13 PROCEDURE — 97110 THERAPEUTIC EXERCISES: CPT

## 2017-12-13 PROCEDURE — 92526 ORAL FUNCTION THERAPY: CPT

## 2017-12-13 PROCEDURE — 92523 SPEECH SOUND LANG COMPREHEN: CPT

## 2017-12-13 PROCEDURE — 85610 PROTHROMBIN TIME: CPT | Performed by: INTERNAL MEDICINE

## 2017-12-13 PROCEDURE — 99232 SBSQ HOSP IP/OBS MODERATE 35: CPT | Performed by: NURSE PRACTITIONER

## 2017-12-13 RX ORDER — LISINOPRIL 40 MG/1
40 TABLET ORAL EVERY 12 HOURS SCHEDULED
Status: DISCONTINUED | OUTPATIENT
Start: 2017-12-13 | End: 2017-12-15 | Stop reason: HOSPADM

## 2017-12-13 RX ORDER — TRAMADOL HYDROCHLORIDE 50 MG/1
50 TABLET ORAL EVERY 6 HOURS PRN
Status: DISCONTINUED | OUTPATIENT
Start: 2017-12-13 | End: 2017-12-15 | Stop reason: HOSPADM

## 2017-12-13 RX ADMIN — ACETAMINOPHEN 650 MG: 325 TABLET ORAL at 02:07

## 2017-12-13 RX ADMIN — CARBAMAZEPINE 400 MG: 100 CAPSULE, EXTENDED RELEASE ORAL at 08:26

## 2017-12-13 RX ADMIN — IBUPROFEN 400 MG: 400 TABLET ORAL at 16:26

## 2017-12-13 RX ADMIN — DIGOXIN 250 MCG: 250 TABLET ORAL at 13:01

## 2017-12-13 RX ADMIN — LISINOPRIL 40 MG: 40 TABLET ORAL at 21:11

## 2017-12-13 RX ADMIN — ATORVASTATIN CALCIUM 80 MG: 80 TABLET, FILM COATED ORAL at 21:11

## 2017-12-13 RX ADMIN — IBUPROFEN 400 MG: 400 TABLET ORAL at 04:46

## 2017-12-13 RX ADMIN — METOPROLOL TARTRATE 50 MG: 50 TABLET, FILM COATED ORAL at 21:11

## 2017-12-13 RX ADMIN — ASPIRIN 325 MG: 325 TABLET ORAL at 08:25

## 2017-12-13 RX ADMIN — WARFARIN SODIUM 7.5 MG: 7.5 TABLET ORAL at 18:11

## 2017-12-13 RX ADMIN — TRAMADOL HYDROCHLORIDE 50 MG: 50 TABLET, FILM COATED ORAL at 21:11

## 2017-12-13 RX ADMIN — AMLODIPINE BESYLATE 10 MG: 10 TABLET ORAL at 08:25

## 2017-12-13 RX ADMIN — METOPROLOL TARTRATE 100 MG: 100 TABLET, FILM COATED ORAL at 08:26

## 2017-12-13 RX ADMIN — LISINOPRIL 40 MG: 40 TABLET ORAL at 08:25

## 2017-12-13 RX ADMIN — CHLORTHALIDONE 25 MG: 25 TABLET ORAL at 08:25

## 2017-12-14 ENCOUNTER — APPOINTMENT (OUTPATIENT)
Dept: GENERAL RADIOLOGY | Facility: HOSPITAL | Age: 82
End: 2017-12-14

## 2017-12-14 LAB
BASOPHILS # BLD AUTO: 0.05 10*3/MM3 (ref 0–0.2)
BASOPHILS NFR BLD AUTO: 0.9 % (ref 0–1.5)
DEPRECATED RDW RBC AUTO: 52.7 FL (ref 37–54)
EOSINOPHIL # BLD AUTO: 0.2 10*3/MM3 (ref 0–0.7)
EOSINOPHIL NFR BLD AUTO: 3.5 % (ref 0.3–6.2)
ERYTHROCYTE [DISTWIDTH] IN BLOOD BY AUTOMATED COUNT: 15.1 % (ref 11.7–13)
HCT VFR BLD AUTO: 34.7 % (ref 35.6–45.5)
HGB BLD-MCNC: 10.7 G/DL (ref 11.9–15.5)
IMM GRANULOCYTES # BLD: 0.02 10*3/MM3 (ref 0–0.03)
IMM GRANULOCYTES NFR BLD: 0.3 % (ref 0–0.5)
INR PPP: 1.87 (ref 0.9–1.1)
LYMPHOCYTES # BLD AUTO: 1.37 10*3/MM3 (ref 0.9–4.8)
LYMPHOCYTES NFR BLD AUTO: 23.8 % (ref 19.6–45.3)
MCH RBC QN AUTO: 29.6 PG (ref 26.9–32)
MCHC RBC AUTO-ENTMCNC: 30.8 G/DL (ref 32.4–36.3)
MCV RBC AUTO: 95.9 FL (ref 80.5–98.2)
MONOCYTES # BLD AUTO: 0.62 10*3/MM3 (ref 0.2–1.2)
MONOCYTES NFR BLD AUTO: 10.8 % (ref 5–12)
NEUTROPHILS # BLD AUTO: 3.49 10*3/MM3 (ref 1.9–8.1)
NEUTROPHILS NFR BLD AUTO: 60.7 % (ref 42.7–76)
NRBC BLD MANUAL-RTO: 0 /100 WBC (ref 0–0)
PLATELET # BLD AUTO: 205 10*3/MM3 (ref 140–500)
PMV BLD AUTO: 11.5 FL (ref 6–12)
PROTHROMBIN TIME: 20.8 SECONDS (ref 11.7–14.2)
RBC # BLD AUTO: 3.62 10*6/MM3 (ref 3.9–5.2)
WBC NRBC COR # BLD: 5.75 10*3/MM3 (ref 4.5–10.7)

## 2017-12-14 PROCEDURE — 73030 X-RAY EXAM OF SHOULDER: CPT

## 2017-12-14 PROCEDURE — 85610 PROTHROMBIN TIME: CPT | Performed by: INTERNAL MEDICINE

## 2017-12-14 PROCEDURE — 97110 THERAPEUTIC EXERCISES: CPT

## 2017-12-14 PROCEDURE — 99232 SBSQ HOSP IP/OBS MODERATE 35: CPT | Performed by: NURSE PRACTITIONER

## 2017-12-14 PROCEDURE — 92526 ORAL FUNCTION THERAPY: CPT

## 2017-12-14 PROCEDURE — 85025 COMPLETE CBC W/AUTO DIFF WBC: CPT | Performed by: INTERNAL MEDICINE

## 2017-12-14 PROCEDURE — 92507 TX SP LANG VOICE COMM INDIV: CPT

## 2017-12-14 RX ADMIN — TRAMADOL HYDROCHLORIDE 50 MG: 50 TABLET, FILM COATED ORAL at 17:18

## 2017-12-14 RX ADMIN — IBUPROFEN 400 MG: 400 TABLET ORAL at 01:01

## 2017-12-14 RX ADMIN — ASPIRIN 325 MG: 325 TABLET ORAL at 08:56

## 2017-12-14 RX ADMIN — TRAMADOL HYDROCHLORIDE 50 MG: 50 TABLET, FILM COATED ORAL at 08:55

## 2017-12-14 RX ADMIN — TRAMADOL HYDROCHLORIDE 50 MG: 50 TABLET, FILM COATED ORAL at 23:10

## 2017-12-14 RX ADMIN — ACETAMINOPHEN 650 MG: 325 TABLET ORAL at 12:07

## 2017-12-14 RX ADMIN — CARBAMAZEPINE 400 MG: 100 CAPSULE, EXTENDED RELEASE ORAL at 00:11

## 2017-12-14 RX ADMIN — AMLODIPINE BESYLATE 10 MG: 10 TABLET ORAL at 08:56

## 2017-12-14 RX ADMIN — METOPROLOL TARTRATE 50 MG: 50 TABLET, FILM COATED ORAL at 20:31

## 2017-12-14 RX ADMIN — LISINOPRIL 40 MG: 40 TABLET ORAL at 20:32

## 2017-12-14 RX ADMIN — CHLORTHALIDONE 25 MG: 25 TABLET ORAL at 08:56

## 2017-12-14 RX ADMIN — CARBAMAZEPINE 400 MG: 100 CAPSULE, EXTENDED RELEASE ORAL at 08:56

## 2017-12-14 RX ADMIN — METOPROLOL TARTRATE 100 MG: 100 TABLET, FILM COATED ORAL at 08:56

## 2017-12-14 RX ADMIN — ATORVASTATIN CALCIUM 80 MG: 80 TABLET, FILM COATED ORAL at 20:32

## 2017-12-14 RX ADMIN — WARFARIN SODIUM 7.5 MG: 7.5 TABLET ORAL at 17:18

## 2017-12-14 RX ADMIN — CARBAMAZEPINE 400 MG: 100 CAPSULE, EXTENDED RELEASE ORAL at 20:31

## 2017-12-14 RX ADMIN — LISINOPRIL 40 MG: 40 TABLET ORAL at 08:56

## 2017-12-14 RX ADMIN — DIGOXIN 250 MCG: 250 TABLET ORAL at 12:07

## 2017-12-15 ENCOUNTER — APPOINTMENT (OUTPATIENT)
Dept: GENERAL RADIOLOGY | Facility: HOSPITAL | Age: 82
End: 2017-12-15

## 2017-12-15 VITALS
TEMPERATURE: 98.1 F | HEART RATE: 69 BPM | BODY MASS INDEX: 23.68 KG/M2 | OXYGEN SATURATION: 95 % | HEIGHT: 61 IN | WEIGHT: 125.4 LBS | RESPIRATION RATE: 18 BRPM | SYSTOLIC BLOOD PRESSURE: 163 MMHG | DIASTOLIC BLOOD PRESSURE: 87 MMHG

## 2017-12-15 LAB
BASOPHILS # BLD AUTO: 0.03 10*3/MM3 (ref 0–0.2)
BASOPHILS NFR BLD AUTO: 0.5 % (ref 0–1.5)
DEPRECATED RDW RBC AUTO: 51.6 FL (ref 37–54)
EOSINOPHIL # BLD AUTO: 0.16 10*3/MM3 (ref 0–0.7)
EOSINOPHIL NFR BLD AUTO: 2.7 % (ref 0.3–6.2)
ERYTHROCYTE [DISTWIDTH] IN BLOOD BY AUTOMATED COUNT: 15 % (ref 11.7–13)
HCT VFR BLD AUTO: 34.5 % (ref 35.6–45.5)
HGB BLD-MCNC: 11 G/DL (ref 11.9–15.5)
IMM GRANULOCYTES # BLD: 0.02 10*3/MM3 (ref 0–0.03)
IMM GRANULOCYTES NFR BLD: 0.3 % (ref 0–0.5)
INR PPP: 2.4 (ref 0.9–1.1)
LYMPHOCYTES # BLD AUTO: 1.4 10*3/MM3 (ref 0.9–4.8)
LYMPHOCYTES NFR BLD AUTO: 23.9 % (ref 19.6–45.3)
MCH RBC QN AUTO: 30.5 PG (ref 26.9–32)
MCHC RBC AUTO-ENTMCNC: 31.9 G/DL (ref 32.4–36.3)
MCV RBC AUTO: 95.6 FL (ref 80.5–98.2)
MONOCYTES # BLD AUTO: 0.54 10*3/MM3 (ref 0.2–1.2)
MONOCYTES NFR BLD AUTO: 9.2 % (ref 5–12)
NEUTROPHILS # BLD AUTO: 3.71 10*3/MM3 (ref 1.9–8.1)
NEUTROPHILS NFR BLD AUTO: 63.4 % (ref 42.7–76)
PLATELET # BLD AUTO: 214 10*3/MM3 (ref 140–500)
PMV BLD AUTO: 11.4 FL (ref 6–12)
PROTHROMBIN TIME: 25.4 SECONDS (ref 11.7–14.2)
RBC # BLD AUTO: 3.61 10*6/MM3 (ref 3.9–5.2)
WBC NRBC COR # BLD: 5.86 10*3/MM3 (ref 4.5–10.7)

## 2017-12-15 PROCEDURE — 85610 PROTHROMBIN TIME: CPT | Performed by: INTERNAL MEDICINE

## 2017-12-15 PROCEDURE — 85025 COMPLETE CBC W/AUTO DIFF WBC: CPT | Performed by: INTERNAL MEDICINE

## 2017-12-15 PROCEDURE — 92611 MOTION FLUOROSCOPY/SWALLOW: CPT

## 2017-12-15 PROCEDURE — 74230 X-RAY XM SWLNG FUNCJ C+: CPT

## 2017-12-15 RX ORDER — ATORVASTATIN CALCIUM 20 MG/1
80 TABLET, FILM COATED ORAL DAILY
Qty: 90 TABLET | Refills: 3 | Status: SHIPPED | OUTPATIENT
Start: 2017-12-15

## 2017-12-15 RX ORDER — WARFARIN SODIUM 2.5 MG/1
2.5 TABLET ORAL
Status: DISCONTINUED | OUTPATIENT
Start: 2017-12-15 | End: 2017-12-15 | Stop reason: HOSPADM

## 2017-12-15 RX ORDER — TRAMADOL HYDROCHLORIDE 50 MG/1
50 TABLET ORAL EVERY 6 HOURS PRN
Qty: 12 TABLET | Refills: 0 | Status: SHIPPED | OUTPATIENT
Start: 2017-12-15 | End: 2017-12-23

## 2017-12-15 RX ORDER — WARFARIN SODIUM 2.5 MG/1
2.5 TABLET ORAL
Qty: 30 TABLET | Refills: 0 | Status: SHIPPED | OUTPATIENT
Start: 2017-12-15 | End: 2018-03-26 | Stop reason: DRUGHIGH

## 2017-12-15 RX ORDER — PANTOPRAZOLE SODIUM 40 MG/1
40 TABLET, DELAYED RELEASE ORAL DAILY
Qty: 30 TABLET | Refills: 0 | Status: SHIPPED | OUTPATIENT
Start: 2017-12-15

## 2017-12-15 RX ADMIN — CARBAMAZEPINE 400 MG: 100 CAPSULE, EXTENDED RELEASE ORAL at 09:01

## 2017-12-15 RX ADMIN — TRAMADOL HYDROCHLORIDE 50 MG: 50 TABLET, FILM COATED ORAL at 05:41

## 2017-12-15 RX ADMIN — ACETAMINOPHEN 650 MG: 325 TABLET ORAL at 02:17

## 2017-12-15 RX ADMIN — CHLORTHALIDONE 25 MG: 25 TABLET ORAL at 09:01

## 2017-12-15 RX ADMIN — PANTOPRAZOLE SODIUM 40 MG: 40 TABLET, DELAYED RELEASE ORAL at 05:41

## 2017-12-15 RX ADMIN — TRAMADOL HYDROCHLORIDE 50 MG: 50 TABLET, FILM COATED ORAL at 14:06

## 2017-12-15 RX ADMIN — ASPIRIN 325 MG: 325 TABLET ORAL at 09:01

## 2017-12-15 RX ADMIN — METOPROLOL TARTRATE 100 MG: 100 TABLET, FILM COATED ORAL at 06:59

## 2017-12-15 RX ADMIN — AMLODIPINE BESYLATE 10 MG: 10 TABLET ORAL at 09:01

## 2017-12-15 RX ADMIN — LISINOPRIL 40 MG: 40 TABLET ORAL at 09:01

## 2017-12-15 RX ADMIN — DIGOXIN 250 MCG: 250 TABLET ORAL at 12:22

## 2017-12-18 ENCOUNTER — EPISODE CHANGES (OUTPATIENT)
Dept: CASE MANAGEMENT | Facility: OTHER | Age: 82
End: 2017-12-18

## 2017-12-18 ENCOUNTER — PATIENT OUTREACH (OUTPATIENT)
Dept: CASE MANAGEMENT | Facility: OTHER | Age: 82
End: 2017-12-18

## 2017-12-20 ENCOUNTER — PATIENT OUTREACH (OUTPATIENT)
Dept: CASE MANAGEMENT | Facility: OTHER | Age: 82
End: 2017-12-20

## 2017-12-27 ENCOUNTER — PATIENT OUTREACH (OUTPATIENT)
Dept: CASE MANAGEMENT | Facility: OTHER | Age: 82
End: 2017-12-27

## 2018-01-03 ENCOUNTER — PATIENT OUTREACH (OUTPATIENT)
Dept: CASE MANAGEMENT | Facility: OTHER | Age: 83
End: 2018-01-03

## 2018-01-05 ENCOUNTER — LAB REQUISITION (OUTPATIENT)
Dept: LAB | Facility: HOSPITAL | Age: 83
End: 2018-01-05

## 2018-01-05 DIAGNOSIS — J11.89 INFLUENZA DUE TO UNIDENTIFIED INFLUENZA VIRUS WITH OTHER MANIFESTATIONS: ICD-10-CM

## 2018-01-10 ENCOUNTER — PATIENT OUTREACH (OUTPATIENT)
Dept: CASE MANAGEMENT | Facility: OTHER | Age: 83
End: 2018-01-10

## 2018-01-18 ENCOUNTER — PATIENT OUTREACH (OUTPATIENT)
Dept: CASE MANAGEMENT | Facility: OTHER | Age: 83
End: 2018-01-18

## 2018-01-22 ENCOUNTER — TELEPHONE (OUTPATIENT)
Dept: INTERNAL MEDICINE | Facility: CLINIC | Age: 83
End: 2018-01-22

## 2018-01-22 NOTE — TELEPHONE ENCOUNTER
----- Message from Rosalinda Weaver sent at 1/22/2018  3:01 PM EST -----  Contact: neto mcmanus  Calling to get orders for PT and OT.  cerebral infraction     Caller number#

## 2018-01-23 ENCOUNTER — PATIENT OUTREACH (OUTPATIENT)
Dept: CASE MANAGEMENT | Facility: OTHER | Age: 83
End: 2018-01-23

## 2018-01-23 NOTE — OUTREACH NOTE
Skilled Nursing Facility Discharge Flowsheet:     Skilled Nursing Facility Discharge Assessment 1/23/2018   Acute Facility Discharged From -   Acute Discharge Date -   Name of the Skilled Nursing Facility? -   Tier Level of the Skilled Nursing Facility -   Purpose of SNF Admission -   Estimated length of stay for the patient? Discharged to [facility] on 1/19/18   Who is the insurance provider or payor of patient stay? -   Progression of Patient? To long-term 1/19/18   Skilled Nursing Discharge Date? 1/19/2018   Where was the patient discharged to? [facility]   Home Health Agency at discharge? Yes   Name of Home Health Agency? [facility]   DME Needed at Discharge? No   Are there any medication concerns at Discharge No   Does the patient have a follow-up appointment? Yes   Comments Regarding F/U Appt. ? 3/26/18 with PCP, Dr. Rios will discuss moving appt. up with family.

## 2018-01-25 ENCOUNTER — TELEPHONE (OUTPATIENT)
Dept: NEUROLOGY | Facility: CLINIC | Age: 83
End: 2018-01-25

## 2018-01-25 ENCOUNTER — EPISODE CHANGES (OUTPATIENT)
Dept: CASE MANAGEMENT | Facility: OTHER | Age: 83
End: 2018-01-25

## 2018-01-25 NOTE — TELEPHONE ENCOUNTER
I called and spoke with both her sons, Ernie and Dr. Jeovanny Moscoso.  I was told she has moved into [facility].  Her sons are in the process of selling her house.  I was told that she is getting around well with a walker.  Dr. Moscoso was agreeable to a follow up appointment and I was able to get a time the same day as her follow up appointment with Dr. Grimm.  The family was unsure about what medications she was taking.  She has completed her PT/OT but continues to have speech therapy.  I asked Dr. Moscoso to call me with any questions.  mRS 4.  E Olga PATRICIO

## 2018-03-26 ENCOUNTER — OFFICE VISIT (OUTPATIENT)
Dept: INTERNAL MEDICINE | Facility: CLINIC | Age: 83
End: 2018-03-26

## 2018-03-26 VITALS
HEIGHT: 61 IN | SYSTOLIC BLOOD PRESSURE: 130 MMHG | OXYGEN SATURATION: 93 % | WEIGHT: 127 LBS | BODY MASS INDEX: 23.98 KG/M2 | HEART RATE: 58 BPM | DIASTOLIC BLOOD PRESSURE: 80 MMHG

## 2018-03-26 DIAGNOSIS — Z79.899 ENCOUNTER FOR MEDICATION MANAGEMENT: ICD-10-CM

## 2018-03-26 DIAGNOSIS — I10 ESSENTIAL HYPERTENSION: Primary | Chronic | ICD-10-CM

## 2018-03-26 DIAGNOSIS — E78.5 HYPERLIPIDEMIA, UNSPECIFIED HYPERLIPIDEMIA TYPE: ICD-10-CM

## 2018-03-26 LAB
ALBUMIN SERPL-MCNC: 4 G/DL (ref 3.5–5.2)
ALBUMIN/GLOB SERPL: 1.3 G/DL
ALP SERPL-CCNC: 101 U/L (ref 39–117)
ALT SERPL W P-5'-P-CCNC: 27 U/L (ref 1–33)
ANION GAP SERPL CALCULATED.3IONS-SCNC: 10.4 MMOL/L
AST SERPL-CCNC: 22 U/L (ref 1–32)
BILIRUB SERPL-MCNC: 0.2 MG/DL (ref 0.1–1.2)
BUN BLD-MCNC: 33 MG/DL (ref 8–23)
BUN/CREAT SERPL: 37.5 (ref 7–25)
CALCIUM SPEC-SCNC: 9.5 MG/DL (ref 8.2–9.6)
CARBAMAZEPINE SERPL-MCNC: 8.8 MCG/ML (ref 4–12)
CHLORIDE SERPL-SCNC: 98 MMOL/L (ref 98–107)
CHOLEST SERPL-MCNC: 177 MG/DL (ref 0–200)
CO2 SERPL-SCNC: 29.6 MMOL/L (ref 22–29)
CREAT BLD-MCNC: 0.88 MG/DL (ref 0.57–1)
DEPRECATED RDW RBC AUTO: 47.7 FL (ref 37–54)
ERYTHROCYTE [DISTWIDTH] IN BLOOD BY AUTOMATED COUNT: 14.6 % (ref 11.5–15)
GFR SERPL CREATININE-BSD FRML MDRD: 60 ML/MIN/1.73
GLOBULIN UR ELPH-MCNC: 3.1 GM/DL
GLUCOSE BLD-MCNC: 100 MG/DL (ref 65–99)
HCT VFR BLD AUTO: 34.7 % (ref 34.1–44.9)
HDLC SERPL-MCNC: 54 MG/DL (ref 40–60)
HGB BLD-MCNC: 11.3 G/DL (ref 11.2–15.7)
LDLC SERPL CALC-MCNC: 92 MG/DL (ref 0–100)
LDLC/HDLC SERPL: 1.71 {RATIO}
MCH RBC QN AUTO: 30.1 PG (ref 26–34)
MCHC RBC AUTO-ENTMCNC: 32.6 G/DL (ref 31–37)
MCV RBC AUTO: 92.5 FL (ref 80–100)
PLATELET # BLD AUTO: 230 10*3/MM3 (ref 150–450)
PMV BLD AUTO: 10.8 FL (ref 6–12)
POTASSIUM BLD-SCNC: 4.8 MMOL/L (ref 3.5–5.2)
PROT SERPL-MCNC: 7.1 G/DL (ref 6–8.5)
RBC # BLD AUTO: 3.75 10*6/MM3 (ref 3.93–5.22)
SODIUM BLD-SCNC: 138 MMOL/L (ref 136–145)
TRIGL SERPL-MCNC: 154 MG/DL (ref 0–150)
VLDLC SERPL-MCNC: 30.8 MG/DL (ref 5–40)
WBC NRBC COR # BLD: 4.72 10*3/MM3 (ref 5–10)

## 2018-03-26 PROCEDURE — 80061 LIPID PANEL: CPT | Performed by: INTERNAL MEDICINE

## 2018-03-26 PROCEDURE — 80053 COMPREHEN METABOLIC PANEL: CPT | Performed by: INTERNAL MEDICINE

## 2018-03-26 PROCEDURE — 99213 OFFICE O/P EST LOW 20 MIN: CPT | Performed by: INTERNAL MEDICINE

## 2018-03-26 PROCEDURE — 85027 COMPLETE CBC AUTOMATED: CPT | Performed by: INTERNAL MEDICINE

## 2018-03-26 RX ORDER — PNV NO.95/FERROUS FUM/FOLIC AC 28MG-0.8MG
TABLET ORAL DAILY
COMMUNITY

## 2018-03-26 RX ORDER — WARFARIN SODIUM 7.5 MG/1
7.5 TABLET ORAL
COMMUNITY
Start: 2018-03-20 | End: 2018-09-24

## 2018-03-26 RX ORDER — LORATADINE 10 MG/1
10 TABLET ORAL DAILY
COMMUNITY
End: 2018-09-24

## 2018-03-26 RX ORDER — WARFARIN SODIUM 10 MG/1
10 TABLET ORAL
COMMUNITY
Start: 2018-03-19 | End: 2018-09-24

## 2018-03-26 RX ORDER — CELECOXIB 100 MG/1
100 CAPSULE ORAL DAILY
COMMUNITY
Start: 2018-02-28

## 2018-03-26 NOTE — PROGRESS NOTES
Chief Complaint   Patient presents with   • Hypertension   • Hyperlipidemia   • Cerebrovascular Accident     had stroke 3 months ago       Subjective   Karena Moscoso is a 89 y.o. female.     Since her last appointment here, she has had a stroke.  She had right sided weakness and difficulty speaking.  Her weakness is improving, speech is improving. She uses a walker. She is now in assisted living. She is taking coumadin and protimes are checked at assisted living.      Hypertension   This is a chronic problem. The problem is controlled. Pertinent negatives include no blurred vision, chest pain, headaches, orthopnea, palpitations, peripheral edema, PND or shortness of breath. Agents associated with hypertension include NSAIDs. Risk factors for coronary artery disease include dyslipidemia. The current treatment provides significant improvement. There are no compliance problems.  Hypertensive end-organ damage includes CVA.   Hyperlipidemia   Recent lipid tests were reviewed and are variable. Factors aggravating her hyperlipidemia include beta blockers and thiazides. Associated symptoms include leg pain and myalgias. Pertinent negatives include no chest pain or shortness of breath. Current antihyperlipidemic treatment includes statins. The current treatment provides moderate improvement of lipids. There are no compliance problems.         The following portions of the patient's history were reviewed and updated as appropriate: allergies, current medications, past family history, past medical history, past social history, past surgical history and problem list.    Review of Systems   Constitutional: Negative for appetite change.   HENT: Negative for nosebleeds.    Eyes: Negative for blurred vision and double vision.   Respiratory: Negative for cough and shortness of breath.    Cardiovascular: Negative for chest pain, palpitations, orthopnea, leg swelling and PND.   Musculoskeletal: Positive for gait problem and myalgias.    Neurological: Positive for speech difficulty. Negative for headaches.         Current Outpatient Prescriptions:   •  amLODIPine (NORVASC) 10 MG tablet, Take 1 tablet by mouth Daily., Disp: 90 tablet, Rfl: 3  •  aspirin 81 MG EC tablet, Take 81 mg by mouth daily., Disp: , Rfl:   •  atorvastatin (LIPITOR) 20 MG tablet, Take 4 tablets by mouth Daily., Disp: 90 tablet, Rfl: 3  •  calcium carbonate (CALCIUM 600) 600 MG tablet, Take 600 mg by mouth Daily., Disp: , Rfl:   •  carBAMazepine XR (TEGretol  XR) 200 MG 12 hr tablet, Take 400 mg by mouth 2 (Two) Times a Day., Disp: , Rfl:   •  celecoxib (CeleBREX) 100 MG capsule, , Disp: , Rfl:   •  chlorthalidone (HYGROTON) 25 MG tablet, Take 1 tablet by mouth Daily., Disp: 90 tablet, Rfl: 3  •  diclofenac (VOLTAREN) 75 MG EC tablet, Take 1 tablet by mouth 2 (Two) Times a Day. Take with food, Disp: 180 tablet, Rfl: 3  •  digoxin (LANOXIN) 125 MCG tablet, Take 1 tablet by mouth Daily. (Patient taking differently: Take 250 mcg by mouth Daily.), Disp: 90 tablet, Rfl: 3  •  Ferrous Sulfate (IRON) 325 (65 Fe) MG tablet, Take  by mouth Daily., Disp: , Rfl:   •  lisinopril (PRINIVIL,ZESTRIL) 40 MG tablet, Take 1 tablet by mouth 2 (Two) Times a Day., Disp: 180 tablet, Rfl: 3  •  loperamide (IMODIUM) 2 MG capsule, Take 2 mg by mouth 4 (Four) Times a Day As Needed for Diarrhea., Disp: , Rfl:   •  loratadine (CLARITIN) 10 MG tablet, Take 10 mg by mouth Daily., Disp: , Rfl:   •  methotrexate 2.5 MG tablet, Take 5 mg by mouth Once When Specified. Takes on Thursday, Disp: , Rfl:   •  metoprolol tartrate (LOPRESSOR) 50 MG tablet, Take 100 mg by mouth Every Morning., Disp: , Rfl:   •  metoprolol tartrate (LOPRESSOR) 50 MG tablet, Take 50 mg by mouth Every Evening., Disp: , Rfl:   •  Multiple Vitamins-Minerals (ICAPS PLUS PO), Take 1 tablet by mouth Daily., Disp: , Rfl:   •  pantoprazole (PROTONIX) 40 MG EC tablet, Take 1 tablet by mouth Daily., Disp: 30 tablet, Rfl: 0  •  warfarin (COUMADIN)  "10 MG tablet, 10 mg. Take one tab on 5 days , TU, WED, FRI, AST, SUN, Disp: , Rfl:   •  warfarin (COUMADIN) 7.5 MG tablet, Take 7.5 mg by mouth. Take on 2 days MON,THU, Disp: , Rfl:         Objective     Vitals:    03/26/18 1038   BP: 130/80   BP Location: Left arm   Pulse: 58   SpO2: 93%   Weight: 57.6 kg (127 lb)   Height: 154.9 cm (60.98\")       Physical Exam   Constitutional: She is oriented to person, place, and time. She appears well-developed and well-nourished. No distress.   Neck: Normal carotid pulses present. Carotid bruit is not present.   Cardiovascular: Regular rhythm, S1 normal and S2 normal.  Exam reveals no gallop and no friction rub.    Murmur heard.  Pulses:       Carotid pulses are 2+ on the right side, and 2+ on the left side.  Pulmonary/Chest: Effort normal and breath sounds normal. She has no wheezes. She has no rhonchi. She has no rales. Chest wall is not dull to percussion.   Musculoskeletal: She exhibits no edema.   Neurological: She is alert and oriented to person, place, and time.   Skin: Skin is warm and dry.   Nursing note and vitals reviewed.        Assessment/Plan   Karena was seen today for hypertension, hyperlipidemia and cerebrovascular accident.    Diagnoses and all orders for this visit:    Essential hypertension  -     Comprehensive Metabolic Panel; Future  -     Lipid Panel; Future  -     CBC (No Diff); Future  -     Comprehensive Metabolic Panel  -     Lipid Panel  -     CBC (No Diff)    Hyperlipidemia, unspecified hyperlipidemia type    Encounter for medication management  -     Carbamazepine level, total; Future  -     Carbamazepine level, total               "

## 2018-04-11 ENCOUNTER — OFFICE VISIT (OUTPATIENT)
Dept: NEUROLOGY | Facility: CLINIC | Age: 83
End: 2018-04-11

## 2018-04-11 VITALS
OXYGEN SATURATION: 95 % | DIASTOLIC BLOOD PRESSURE: 66 MMHG | HEART RATE: 55 BPM | WEIGHT: 130 LBS | BODY MASS INDEX: 25.52 KG/M2 | HEIGHT: 60 IN | SYSTOLIC BLOOD PRESSURE: 132 MMHG

## 2018-04-11 DIAGNOSIS — I48.91 ATRIAL FIBRILLATION, UNSPECIFIED TYPE (HCC): ICD-10-CM

## 2018-04-11 DIAGNOSIS — E78.5 HYPERLIPIDEMIA, UNSPECIFIED HYPERLIPIDEMIA TYPE: ICD-10-CM

## 2018-04-11 DIAGNOSIS — I63.412 CEREBROVASCULAR ACCIDENT (CVA) DUE TO EMBOLISM OF LEFT MIDDLE CEREBRAL ARTERY (HCC): ICD-10-CM

## 2018-04-11 DIAGNOSIS — I10 ESSENTIAL HYPERTENSION: Chronic | ICD-10-CM

## 2018-04-11 PROCEDURE — 99213 OFFICE O/P EST LOW 20 MIN: CPT | Performed by: NURSE PRACTITIONER

## 2018-04-11 NOTE — PROGRESS NOTES
DOS: 2018  NAME: Karena Moscoso   : 1926  PCP: Alexus Grimm MD    Chief Complaint   Patient presents with   • Cerebrovascular Accident        Neurological Problem and Interval History:  89 y.o. RHW female with presumed atrial fib, hx of atrial flutter (on dig), stroke, HTN, HLD and trigeminal neuralgia. She presents today for her 3 month stroke follow up. She is accompanied by her son,  Danis.     She denies any recurrent S/S of stroke. She is now in assisted living, [facility], who helps with medicaitons and some ADLs. She completed therapy. Her balance is worse since the strokes, no falls. She continues with some speech trouble. The MD at her facility manages her INR and she has been therapeutic for about a month. Her last INR was at 3.6 and they have lowered her dosing. She does feel more tearful and maybe depressed because she is not as independent anymore and not at home. Her son notes she was tearful prior to the stroke. She is tolerating her medication. She continues to have some speech trouble, dysarthria.      she was on the phone with a friend and developed speech trouble. EMS brought her to [facility] and she was noted to have aphasia and right side weakness. She was given IV thrombolysis and improved. The etiology was likely cardioembolic, atrial fib, and she was started on Coumadin. She was on ASA 81mg and lipitor 20mg at the time of the event. She was independent at home. She was D/C to [facility] for therapy and then to an assisted living. She did not wear a heart monitor after D/C.     Review of Systems:        Review of Systems   Constitutional: Positive for activity change. Negative for chills, fatigue and fever.   HENT: Positive for drooling and hearing loss. Negative for tinnitus and trouble swallowing.    Eyes: Negative for pain, discharge, redness and itching.   Respiratory: Negative for apnea, chest tightness and shortness of breath.    Cardiovascular: Negative for chest pain,  "palpitations and leg swelling.   Gastrointestinal: Positive for diarrhea. Negative for constipation, rectal pain and vomiting.   Endocrine: Negative for cold intolerance, heat intolerance and polydipsia.   Genitourinary: Negative for decreased urine volume, difficulty urinating, frequency and urgency.   Musculoskeletal: Negative for arthralgias, back pain, neck pain and neck stiffness.   Skin: Negative for color change, rash and wound.   Allergic/Immunologic: Negative for environmental allergies, food allergies and immunocompromised state.   Neurological: Positive for speech difficulty and numbness. Negative for dizziness, tremors, seizures, syncope, facial asymmetry, weakness, light-headedness and headaches.   Hematological: Negative for adenopathy. Bruises/bleeds easily.   Psychiatric/Behavioral: Negative for agitation, behavioral problems, confusion, decreased concentration, dysphoric mood, hallucinations, self-injury, sleep disturbance and suicidal ideas. The patient is nervous/anxious. The patient is not hyperactive.        \"The following portions of the patient's history were reviewed and updated as appropriate: allergies, current medications, past family history, past medical history, past social history, past surgical history and problem list.\"      Laboratory Results:             Lab Results   Component Value Date    HGBA1C 5.69 (H) 12/08/2017         Lab Results   Component Value Date    CHOL 177 03/26/2018    CHOL 191 12/08/2017    CHOL 223 (H) 11/10/2017         Lab Results   Component Value Date    HDL 54 03/26/2018    HDL 47 12/08/2017    HDL 53 11/10/2017         Lab Results   Component Value Date    LDL 92 03/26/2018     (H) 12/08/2017     (H) 11/10/2017         Lab Results   Component Value Date    TRIG 154 (H) 03/26/2018    TRIG 162 (H) 12/08/2017    TRIG 201 (H) 11/10/2017     No results found for: RPR  Lab Results   Component Value Date    TSH 3.240 12/10/2017     No results found " for: [id number]    Physical Examination:  mRS:   General Appearance:   Well developed, well nourished, well groomed, alert, and cooperative.  HEENT: Normocephalic.   Neck and Spine: No bruits.  Cardiac: Regular rate and rhythm. + murmurs.  Extremities:    No edema or deformities.   Neurological examination:  Higher Integrative  Function: Oriented to time, place and person. Normal registration, attention span and concentration. Normal language including comprehension, spontaneous speech, repetition, reading, naming and vocabulary. No neglect with normal visual-spatial function and construction. Fair fund of knowledge and higher integrative function.  CN II: Pupils are equal, round, and reactive to light. Normal visual acuity and visual fields.    CN III IV VI: Extraocular movements are full without nystagmus.   CN V: Normal facial sensation and strength of muscles of mastication.  CN VII: Facial movements are symmetric. No weakness.  CN VIII:   Auditory acuity is normal.  CN IX & X:   dysarthric   CN XI: Sternocleidomastoid and trapezius are normal.  No weakness.  CN XII:   The tongue is midline.  No atrophy or fasciculations.  Motor: Normal muscle strength, bulk and tone in upper and lower extremities.  No fasciculations, rigidity, spasticity, or abnormal movements.  Sensation: Normal to light touch, temperature, and proprioception in arms and legs and decreased vibration from the knees down bilaterally. Normal graphesthesia and no extinction on DSS.  Station and Gait: Slow, cautious   Coordination: Rapid alternating movements are normal.     Diagnoses / Discussion: Ms. Moscoso is a 89yo who presents 3 months post small LMCA infarct. The etiology is likely cardio embolic, atrial fib, considering her TTE results. She had no evidence of significant stenosis on the CTA. She will need to remain on anticoagulation. From a stroke point of view she does not need antiplatelet therapy. She needs continued aggressive risk  factor control. At the time of the event her LDL >100, we recommend high dose Lipitor for goal LDL <70 per the SPARCL trial. She is dysarthric and appears to have some trouble with her saliva. If the note overt swallowing difficulties she should go back into S.T. Regarding her mood I recommend she be treated and would defer to Dr. Grimm for management. She and her sone agree with the plan and will call with any questions or concerns.     Plan:   Recommend Treating depression, she would like Dr. Grimm to manage    Continue coumadin    Blood pressure control to <130/80   Goal LDL <70-recommend high dose statins-    Serum glucose < 140   Call 911 for stroke any stroke symptoms   Follow-up 9 months   Karena was seen today for cerebrovascular accident.    Diagnoses and all orders for this visit:    Cerebrovascular accident (CVA) due to embolism of left middle cerebral artery    Essential hypertension    Hyperlipidemia, unspecified hyperlipidemia type    Atrial fibrillation, unspecified type        Coding

## 2018-04-12 ENCOUNTER — TELEPHONE (OUTPATIENT)
Dept: INTERNAL MEDICINE | Facility: CLINIC | Age: 83
End: 2018-04-12

## 2018-04-12 NOTE — TELEPHONE ENCOUNTER
----- Message from Marisa Colon sent at 4/12/2018 12:42 PM EDT -----  Contact: Jeovanny son - Dr york' pt - RE: appt & Rx  Pt was seen yesterday [facility] stroke follow-up from December. Son calling and would like to inform that Luciana mentioned pt needing antidepressant. Pt had stroke, taken out of home. Sone does not want to start Rx until pt & son are seen in December. Son would like to have a face to face conversation regarding this before pt is to be put on on Rx from Neurologist regarding depression. Could you please call silvia Up regarding this? Please advise. Thanks      Jeovanny son  Call after 04/27/2018, son  [phone]

## 2018-09-24 ENCOUNTER — OFFICE VISIT (OUTPATIENT)
Dept: INTERNAL MEDICINE | Facility: CLINIC | Age: 83
End: 2018-09-24

## 2018-09-24 VITALS
WEIGHT: 128 LBS | BODY MASS INDEX: 25.13 KG/M2 | DIASTOLIC BLOOD PRESSURE: 62 MMHG | SYSTOLIC BLOOD PRESSURE: 140 MMHG | HEIGHT: 60 IN

## 2018-09-24 DIAGNOSIS — K64.9 HEMORRHOIDS, UNSPECIFIED HEMORRHOID TYPE: ICD-10-CM

## 2018-09-24 DIAGNOSIS — E78.5 HYPERLIPIDEMIA, UNSPECIFIED HYPERLIPIDEMIA TYPE: Primary | ICD-10-CM

## 2018-09-24 DIAGNOSIS — K62.5 RECTAL BLEEDING: ICD-10-CM

## 2018-09-24 DIAGNOSIS — I10 ESSENTIAL HYPERTENSION: Chronic | ICD-10-CM

## 2018-09-24 LAB — HEMOCCULT STL QL IA: POSITIVE

## 2018-09-24 PROCEDURE — 99214 OFFICE O/P EST MOD 30 MIN: CPT | Performed by: INTERNAL MEDICINE

## 2018-09-24 PROCEDURE — 82274 ASSAY TEST FOR BLOOD FECAL: CPT | Performed by: INTERNAL MEDICINE

## 2018-09-24 PROCEDURE — G0008 ADMIN INFLUENZA VIRUS VAC: HCPCS | Performed by: INTERNAL MEDICINE

## 2018-09-24 PROCEDURE — 90662 IIV NO PRSV INCREASED AG IM: CPT | Performed by: INTERNAL MEDICINE

## 2018-09-24 RX ORDER — APIXABAN 2.5 MG/1
2.5 TABLET, FILM COATED ORAL 2 TIMES DAILY
COMMUNITY
Start: 2018-09-14

## 2018-09-24 RX ORDER — LEVOCETIRIZINE DIHYDROCHLORIDE 5 MG/1
5 TABLET, FILM COATED ORAL NIGHTLY
COMMUNITY
Start: 2018-09-12

## 2018-09-24 RX ORDER — ESCITALOPRAM OXALATE 10 MG/1
10 TABLET ORAL DAILY
COMMUNITY
Start: 2018-09-09

## 2018-09-24 NOTE — PROGRESS NOTES
Chief Complaint   Patient presents with   • Hyperlipidemia     6 month follow up   • Hypertension       Subjective   Karena Moscoso is a 89 y.o. female.     Hyperlipidemia   This is a chronic problem. The problem is controlled. Recent lipid tests were reviewed and are variable. She has no history of liver disease or obesity. Associated symptoms include shortness of breath (a little if she walks fast). Pertinent negatives include no chest pain. Current antihyperlipidemic treatment includes statins. The current treatment provides significant improvement of lipids. There are no compliance problems.    Hypertension   This is a chronic problem. The problem is unchanged. The problem is controlled. Associated symptoms include shortness of breath (a little if she walks fast). Pertinent negatives include no chest pain, headaches, orthopnea, palpitations, peripheral edema or PND. There are no associated agents to hypertension. Current antihypertension treatment includes calcium channel blockers, diuretics, ACE inhibitors and beta blockers. The current treatment provides moderate improvement. There are no compliance problems.    Rectal bleeding: This is a new problem.  In the last 2 weeks, she's been noticing blood on the toilet tissue when she wipes after a bowel movement.  She has not had constipation.  She does have loose bowel movements and is sometimes incontinent of stool.  She has not noticed any nausea or vomiting.  She has some abdominal pain every once in a while..  She does not think her bowel movements look like or maroon.  She only notices the blood on the toilet tissue.  It does not hurt when she has a bowel movement.   Tinnitus: She has been aware tinnitus.  This mainly affects her right ear.  It comes and goes.    The following portions of the patient's history were reviewed and updated as appropriate: allergies, current medications, past family history, past medical history, past social history, past surgical  history and problem list.    Review of Systems   Constitutional: Negative for appetite change.   HENT: Positive for tinnitus.    Eyes: Positive for visual disturbance (macualr degeneration).   Respiratory: Positive for shortness of breath (a little if she walks fast). Negative for cough.    Cardiovascular: Negative for chest pain, palpitations, orthopnea, leg swelling and PND.   Gastrointestinal: Positive for abdominal pain (occasionally), anal bleeding and diarrhea (no change IBS). Negative for constipation, nausea and vomiting.   Neurological: Negative for dizziness and headache.         Current Outpatient Prescriptions:   •  amLODIPine (NORVASC) 10 MG tablet, Take 1 tablet by mouth Daily., Disp: 90 tablet, Rfl: 3  •  aspirin 81 MG EC tablet, Take 81 mg by mouth daily., Disp: , Rfl:   •  calcium carbonate (CALCIUM 600) 600 MG tablet, Take 600 mg by mouth Daily., Disp: , Rfl:   •  carBAMazepine XR (TEGretol  XR) 200 MG 12 hr tablet, Take 400 mg by mouth 2 (Two) Times a Day., Disp: , Rfl:   •  celecoxib (CeleBREX) 100 MG capsule, Take 100 mg by mouth Daily., Disp: , Rfl:   •  chlorthalidone (HYGROTON) 25 MG tablet, Take 1 tablet by mouth Daily. (Patient taking differently: Take 25 mg by mouth Daily. Taking 12.5 mg), Disp: 90 tablet, Rfl: 3  •  digoxin (LANOXIN) 125 MCG tablet, Take 1 tablet by mouth Daily. (Patient taking differently: Take 250 mcg by mouth Daily.), Disp: 90 tablet, Rfl: 3  •  ELIQUIS 2.5 MG tablet tablet, Take 2.5 mg by mouth 2 (Two) Times a Day., Disp: , Rfl:   •  escitalopram (LEXAPRO) 10 MG tablet, Take 10 mg by mouth Daily., Disp: , Rfl:   •  Ferrous Sulfate (IRON) 325 (65 Fe) MG tablet, Take  by mouth Daily., Disp: , Rfl:   •  lisinopril (PRINIVIL,ZESTRIL) 40 MG tablet, Take 1 tablet by mouth 2 (Two) Times a Day., Disp: 180 tablet, Rfl: 3  •  methotrexate 2.5 MG tablet, Take 5 mg by mouth Once When Specified. Takes on Thursday, Disp: , Rfl:   •  metoprolol tartrate (LOPRESSOR) 50 MG tablet,  "Take 100 mg by mouth Every Morning., Disp: , Rfl:   •  metoprolol tartrate (LOPRESSOR) 50 MG tablet, Take 50 mg by mouth Every Evening., Disp: , Rfl:   •  Multiple Vitamins-Minerals (ICAPS PLUS PO), Take 1 tablet by mouth Daily., Disp: , Rfl:   •  pantoprazole (PROTONIX) 40 MG EC tablet, Take 1 tablet by mouth Daily., Disp: 30 tablet, Rfl: 0  •  atorvastatin (LIPITOR) 20 MG tablet, Take 4 tablets by mouth Daily. (Patient taking differently: Take  by mouth Daily.), Disp: 90 tablet, Rfl: 3  •  diclofenac (VOLTAREN) 75 MG EC tablet, Take 1 tablet by mouth 2 (Two) Times a Day. Take with food, Disp: 180 tablet, Rfl: 3  •  levocetirizine (XYZAL) 5 MG tablet, Take 5 mg by mouth Every Night., Disp: , Rfl:   •  loperamide (IMODIUM) 2 MG capsule, Take 2 mg by mouth 4 (Four) Times a Day As Needed for Diarrhea., Disp: , Rfl:         Objective     /62   Ht 152.4 cm (60\")   Wt 58.1 kg (128 lb)   BMI 25.00 kg/m²     Physical Exam   Constitutional: She is oriented to person, place, and time. She appears well-developed and well-nourished. No distress.   HENT:   Right Ear: Tympanic membrane and ear canal normal.   Left Ear: Tympanic membrane and ear canal normal.   Neck: Normal carotid pulses present. Carotid bruit is not present.   Cardiovascular: Regular rhythm, S1 normal and S2 normal.  Exam reveals no gallop and no friction rub.    No murmur heard.  Pulses:       Carotid pulses are 2+ on the right side, and 2+ on the left side.  Pulmonary/Chest: Effort normal and breath sounds normal. She has no wheezes. She has no rhonchi. She has no rales. Chest wall is not dull to percussion.   Abdominal: Soft.   Genitourinary: Rectal exam shows external hemorrhoid and guaiac positive stool.   Musculoskeletal: She exhibits no edema.   Lymphadenopathy:     She has no cervical adenopathy.   Neurological: She is alert and oriented to person, place, and time.   Skin: Skin is warm and dry.   Nursing note and vitals reviewed.    Labs done " at assisted living 8/23/2018 shows hemoglobin 10.1.    Assessment/Plan   Karena was seen today for hyperlipidemia and hypertension.    Diagnoses and all orders for this visit:    Hyperlipidemia, unspecified hyperlipidemia type    Essential hypertension    Hemorrhoids, unspecified hemorrhoid type    Rectal bleeding    Other orders  -     Fluzone High Dose =>65Years     discussed heme positive stool and rectal bleeding.  Likely due to hemorrhoids.  Recommended Anusol suppository twice a day for one week.  Repeat stool Hemoccult in 2 weeks.  Stop aspirin.  May continue Eliquis.

## 2018-10-10 ENCOUNTER — EPISODE CHANGES (OUTPATIENT)
Dept: CASE MANAGEMENT | Facility: OTHER | Age: 83
End: 2018-10-10

## 2019-01-10 ENCOUNTER — TELEPHONE (OUTPATIENT)
Dept: NEUROLOGY | Facility: CLINIC | Age: 84
End: 2019-01-10

## 2019-01-10 NOTE — TELEPHONE ENCOUNTER
----- Message from Verona Sykes sent at 1/10/2019 12:44 PM EST -----  Contact: pt [phone]  Pt was due for a stroke follow up, pt was a [facility] pt and was now going to scheduled with [facility]. Called pt to schedule stroke follow up appointment and Pt son refused to schedule because they are continuing her care with the facility the pt is at.     Call was made 1/10/19 1120 by office

## 2019-07-23 ENCOUNTER — TRANSCRIBE ORDERS (OUTPATIENT)
Dept: ADMINISTRATIVE | Facility: HOSPITAL | Age: 84
End: 2019-07-23

## 2019-07-23 DIAGNOSIS — R00.1 BRADYCARDIA: Primary | ICD-10-CM

## 2019-07-23 DIAGNOSIS — R55 SYNCOPE AND COLLAPSE: ICD-10-CM

## 2019-07-25 ENCOUNTER — HOSPITAL ENCOUNTER (OUTPATIENT)
Dept: CARDIOLOGY | Facility: HOSPITAL | Age: 84
Discharge: HOME | End: 2019-07-25
Admitting: INTERNAL MEDICINE

## 2019-07-25 DIAGNOSIS — R00.1 BRADYCARDIA: ICD-10-CM

## 2019-07-25 DIAGNOSIS — R55 SYNCOPE AND COLLAPSE: ICD-10-CM

## 2019-07-25 PROCEDURE — 93226 XTRNL ECG REC<48 HR SCAN A/R: CPT

## 2019-07-25 PROCEDURE — 93225 XTRNL ECG REC<48 HRS REC: CPT

## 2019-07-31 PROCEDURE — 93227 XTRNL ECG REC<48 HR R&I: CPT | Performed by: INTERNAL MEDICINE

## 2019-12-27 ENCOUNTER — LAB REQUISITION (OUTPATIENT)
Dept: LAB | Facility: HOSPITAL | Age: 84
End: 2019-12-27

## 2019-12-27 DIAGNOSIS — Z00.00 ROUTINE GENERAL MEDICAL EXAMINATION AT A HEALTH CARE FACILITY: ICD-10-CM

## 2019-12-27 LAB
ALBUMIN SERPL-MCNC: 4.1 G/DL (ref 3.5–5.2)
ALBUMIN/GLOB SERPL: 1.5 G/DL
ALP SERPL-CCNC: 106 U/L (ref 39–117)
ALT SERPL W P-5'-P-CCNC: 23 U/L (ref 1–33)
ANION GAP SERPL CALCULATED.3IONS-SCNC: 13.7 MMOL/L (ref 5–15)
AST SERPL-CCNC: 16 U/L (ref 1–32)
BASOPHILS # BLD AUTO: 0.04 10*3/MM3 (ref 0–0.2)
BASOPHILS NFR BLD AUTO: 0.7 % (ref 0–1.5)
BILIRUB SERPL-MCNC: 0.2 MG/DL (ref 0.2–1.2)
BUN BLD-MCNC: 31 MG/DL (ref 8–23)
BUN/CREAT SERPL: 32.3 (ref 7–25)
CALCIUM SPEC-SCNC: 9 MG/DL (ref 8.2–9.6)
CHLORIDE SERPL-SCNC: 99 MMOL/L (ref 98–107)
CO2 SERPL-SCNC: 21.3 MMOL/L (ref 22–29)
CREAT BLD-MCNC: 0.96 MG/DL (ref 0.57–1)
DEPRECATED RDW RBC AUTO: 47.3 FL (ref 37–54)
DIGOXIN SERPL-MCNC: 0.7 NG/ML (ref 0.6–1.2)
EOSINOPHIL # BLD AUTO: 0.1 10*3/MM3 (ref 0–0.4)
EOSINOPHIL NFR BLD AUTO: 1.7 % (ref 0.3–6.2)
ERYTHROCYTE [DISTWIDTH] IN BLOOD BY AUTOMATED COUNT: 14.8 % (ref 12.3–15.4)
GFR SERPL CREATININE-BSD FRML MDRD: 54 ML/MIN/1.73
GLOBULIN UR ELPH-MCNC: 2.7 GM/DL
GLUCOSE BLD-MCNC: 110 MG/DL (ref 65–99)
HCT VFR BLD AUTO: 31.3 % (ref 34–46.6)
HGB BLD-MCNC: 10.4 G/DL (ref 12–15.9)
IMM GRANULOCYTES # BLD AUTO: 0.06 10*3/MM3 (ref 0–0.05)
IMM GRANULOCYTES NFR BLD AUTO: 1 % (ref 0–0.5)
LYMPHOCYTES # BLD AUTO: 1.01 10*3/MM3 (ref 0.7–3.1)
LYMPHOCYTES NFR BLD AUTO: 17.4 % (ref 19.6–45.3)
MCH RBC QN AUTO: 29.8 PG (ref 26.6–33)
MCHC RBC AUTO-ENTMCNC: 33.2 G/DL (ref 31.5–35.7)
MCV RBC AUTO: 89.7 FL (ref 79–97)
MONOCYTES # BLD AUTO: 0.6 10*3/MM3 (ref 0.1–0.9)
MONOCYTES NFR BLD AUTO: 10.3 % (ref 5–12)
NEUTROPHILS # BLD AUTO: 3.99 10*3/MM3 (ref 1.7–7)
NEUTROPHILS NFR BLD AUTO: 68.9 % (ref 42.7–76)
NRBC BLD AUTO-RTO: 0 /100 WBC (ref 0–0.2)
PLATELET # BLD AUTO: 283 10*3/MM3 (ref 140–450)
PMV BLD AUTO: 10 FL (ref 6–12)
POTASSIUM BLD-SCNC: 4.9 MMOL/L (ref 3.5–5.2)
PROT SERPL-MCNC: 6.8 G/DL (ref 6–8.5)
RBC # BLD AUTO: 3.49 10*6/MM3 (ref 3.77–5.28)
SODIUM BLD-SCNC: 134 MMOL/L (ref 136–145)
WBC NRBC COR # BLD: 5.8 10*3/MM3 (ref 3.4–10.8)

## 2019-12-27 PROCEDURE — 80162 ASSAY OF DIGOXIN TOTAL: CPT

## 2019-12-27 PROCEDURE — 85025 COMPLETE CBC W/AUTO DIFF WBC: CPT

## 2019-12-27 PROCEDURE — 80053 COMPREHEN METABOLIC PANEL: CPT

## 2020-04-17 ENCOUNTER — LAB REQUISITION (OUTPATIENT)
Dept: LAB | Facility: HOSPITAL | Age: 85
End: 2020-04-17

## 2020-04-17 DIAGNOSIS — Z00.00 ROUTINE GENERAL MEDICAL EXAMINATION AT A HEALTH CARE FACILITY: ICD-10-CM

## 2020-04-17 LAB
ANION GAP SERPL CALCULATED.3IONS-SCNC: 12.5 MMOL/L (ref 5–15)
BASOPHILS # BLD AUTO: 0.03 10*3/MM3 (ref 0–0.2)
BASOPHILS NFR BLD AUTO: 0.7 % (ref 0–1.5)
BUN BLD-MCNC: 19 MG/DL (ref 8–23)
BUN/CREAT SERPL: 17.9 (ref 7–25)
CALCIUM SPEC-SCNC: 8.4 MG/DL (ref 8.2–9.6)
CHLORIDE SERPL-SCNC: 95 MMOL/L (ref 98–107)
CO2 SERPL-SCNC: 21.5 MMOL/L (ref 22–29)
CREAT BLD-MCNC: 1.06 MG/DL (ref 0.57–1)
DEPRECATED RDW RBC AUTO: 42.2 FL (ref 37–54)
DIGOXIN SERPL-MCNC: 0.8 NG/ML (ref 0.6–1.2)
EOSINOPHIL # BLD AUTO: 0.11 10*3/MM3 (ref 0–0.4)
EOSINOPHIL NFR BLD AUTO: 2.6 % (ref 0.3–6.2)
ERYTHROCYTE [DISTWIDTH] IN BLOOD BY AUTOMATED COUNT: 12.8 % (ref 12.3–15.4)
GFR SERPL CREATININE-BSD FRML MDRD: 48 ML/MIN/1.73
GLUCOSE BLD-MCNC: 113 MG/DL (ref 65–99)
HCT VFR BLD AUTO: 33.6 % (ref 34–46.6)
HGB BLD-MCNC: 11 G/DL (ref 12–15.9)
IMM GRANULOCYTES # BLD AUTO: 0.02 10*3/MM3 (ref 0–0.05)
IMM GRANULOCYTES NFR BLD AUTO: 0.5 % (ref 0–0.5)
LYMPHOCYTES # BLD AUTO: 0.87 10*3/MM3 (ref 0.7–3.1)
LYMPHOCYTES NFR BLD AUTO: 20.7 % (ref 19.6–45.3)
MCH RBC QN AUTO: 29.6 PG (ref 26.6–33)
MCHC RBC AUTO-ENTMCNC: 32.7 G/DL (ref 31.5–35.7)
MCV RBC AUTO: 90.3 FL (ref 79–97)
MONOCYTES # BLD AUTO: 0.53 10*3/MM3 (ref 0.1–0.9)
MONOCYTES NFR BLD AUTO: 12.6 % (ref 5–12)
NEUTROPHILS # BLD AUTO: 2.65 10*3/MM3 (ref 1.7–7)
NEUTROPHILS NFR BLD AUTO: 62.9 % (ref 42.7–76)
NRBC BLD AUTO-RTO: 0 /100 WBC (ref 0–0.2)
PLATELET # BLD AUTO: 223 10*3/MM3 (ref 140–450)
PMV BLD AUTO: 10.2 FL (ref 6–12)
POTASSIUM BLD-SCNC: 4.8 MMOL/L (ref 3.5–5.2)
RBC # BLD AUTO: 3.72 10*6/MM3 (ref 3.77–5.28)
SODIUM BLD-SCNC: 129 MMOL/L (ref 136–145)
WBC NRBC COR # BLD: 4.21 10*3/MM3 (ref 3.4–10.8)

## 2020-04-17 PROCEDURE — 80162 ASSAY OF DIGOXIN TOTAL: CPT

## 2020-04-17 PROCEDURE — 80048 BASIC METABOLIC PNL TOTAL CA: CPT

## 2020-04-17 PROCEDURE — 85025 COMPLETE CBC W/AUTO DIFF WBC: CPT
